# Patient Record
Sex: FEMALE | Race: OTHER | HISPANIC OR LATINO
[De-identification: names, ages, dates, MRNs, and addresses within clinical notes are randomized per-mention and may not be internally consistent; named-entity substitution may affect disease eponyms.]

---

## 2021-02-25 ENCOUNTER — APPOINTMENT (OUTPATIENT)
Dept: UROLOGY | Facility: CLINIC | Age: 21
End: 2021-02-25
Payer: MEDICAID

## 2021-02-25 VITALS
BODY MASS INDEX: 34.41 KG/M2 | HEART RATE: 87 BPM | HEIGHT: 62 IN | OXYGEN SATURATION: 96 % | DIASTOLIC BLOOD PRESSURE: 68 MMHG | SYSTOLIC BLOOD PRESSURE: 114 MMHG | WEIGHT: 187 LBS | TEMPERATURE: 98.3 F | RESPIRATION RATE: 14 BRPM

## 2021-02-25 DIAGNOSIS — N39.44 NOCTURNAL ENURESIS: ICD-10-CM

## 2021-02-25 DIAGNOSIS — N39.498 OTHER SPECIFIED URINARY INCONTINENCE: ICD-10-CM

## 2021-02-25 PROBLEM — Z00.00 ENCOUNTER FOR PREVENTIVE HEALTH EXAMINATION: Status: ACTIVE | Noted: 2021-02-25

## 2021-02-25 PROCEDURE — 99072 ADDL SUPL MATRL&STAF TM PHE: CPT

## 2021-02-25 PROCEDURE — 99203 OFFICE O/P NEW LOW 30 MIN: CPT

## 2021-02-25 RX ORDER — LEVONORGESTREL AND ETHINYL ESTRADIOL 0.1-0.02MG
KIT ORAL
Refills: 0 | Status: ACTIVE | COMMUNITY

## 2021-03-01 NOTE — REVIEW OF SYSTEMS
[Sore Throat] : sore throat [Diarrhea] : diarrhea [Heartburn] : heartburn [Painful [place]] : painful [place] [Genital bacterial infection] : genital bacterial infection [Sexually Transmitted Disease (STD)] : sexually transmitted disease [Date of last menstrual period ____] : date of last menstrual period: [unfilled] [Abnormal menstrual period, if yes explain ___] : abnormal menstrual period [unfilled] [Urine Infection (bladder/kidney)] : bladder/kidney infection [Wake up at night to urinate  How many times?  ___] : wakes up to urinate [unfilled] times during the night [Strong urge to urinate] : strong urge to urinate [Bladder pressure] : experiences bladder pressure [Strain or push to urinate] : strain or push to urinate [Itching] : itching [Anxiety] : anxiety [Feelings Of Weakness] : feelings of weakness [Negative] : Heme/Lymph

## 2021-03-01 NOTE — PHYSICAL EXAM
[General Appearance - Well Developed] : well developed [General Appearance - Well Nourished] : well nourished [General Appearance - In No Acute Distress] : no acute distress [Edema] : no peripheral edema [Exaggerated Use Of Accessory Muscles For Inspiration] : no accessory muscle use [Abdomen Soft] : soft [Abdomen Tenderness] : non-tender [Costovertebral Angle Tenderness] : no ~M costovertebral angle tenderness [Urinary Bladder Findings] : the bladder was normal on palpation [Normal Station and Gait] : the gait and station were normal for the patient's age [Skin Color & Pigmentation] : normal skin color and pigmentation [No Focal Deficits] : no focal deficits [Oriented To Time, Place, And Person] : oriented to person, place, and time [Cervical Lymph Nodes Enlarged Anterior Bilaterally] : anterior cervical [Supraclavicular Lymph Nodes Enlarged Bilaterally] : supraclavicular

## 2021-03-01 NOTE — ASSESSMENT
[FreeTextEntry1] : Nocturnal enuresis and OAB with incontinence. Sx are longstanding. No evidence of voiding dysfunction by hx. Has failed first line therapies. Discussed possible PTNS. For now, [facility] and consider referral for UDS and tx. \par --Refer to Dr Nails for UDS

## 2021-03-01 NOTE — HISTORY OF PRESENT ILLNESS
[FreeTextEntry1] : 19yo female with cc of urinary frequency and incontinence. Pt reports having issues with bedwetting until she was 16. She continues to have some issues on occasion with occasional daytime urge incontinence. Will also have nocturnal enuresis on occasion (once per week). This is sometimes related to alcohol (about once per month). At baseline, has nocturia 2-3 times. She has less daytime bother going 4-6 times in a day. She notes her underwear is often damp and she is not always aware of when she is leaking. She drinks very little. No issues with constipation. Denies straining to void. No abd/pelvic pain. She recalls always leaking. she has tried myrbetriq, kegels, and desmopressin all without benefit. Mom had similar issues until her teens. No hx of UDS testing.

## 2021-03-01 NOTE — REVIEW OF SYSTEMS
[Sore Throat] : sore throat [Diarrhea] : diarrhea [Heartburn] : heartburn [Painful [place]] : painful [place] [Genital bacterial infection] : genital bacterial infection [Sexually Transmitted Disease (STD)] : sexually transmitted disease [Date of last menstrual period ____] : date of last menstrual period: [unfilled] [Abnormal menstrual period, if yes explain ___] : abnormal menstrual period [unfilled] [Urine Infection (bladder/kidney)] : bladder/kidney infection [Wake up at night to urinate  How many times?  ___] : wakes up to urinate [unfilled] times during the night [Strong urge to urinate] : strong urge to urinate [Bladder pressure] : experiences bladder pressure [Strain or push to urinate] : strain or push to urinate [Itching] : itching [Anxiety] : anxiety [Feelings Of Weakness] : feelings of weakness [Negative] : Heme/Lymph

## 2021-03-01 NOTE — HISTORY OF PRESENT ILLNESS
[FreeTextEntry1] : 21yo female with cc of urinary frequency and incontinence. Pt reports having issues with bedwetting until she was 16. She continues to have some issues on occasion with occasional daytime urge incontinence. Will also have nocturnal enuresis on occasion (once per week). This is sometimes related to alcohol (about once per month). At baseline, has nocturia 2-3 times. She has less daytime bother going 4-6 times in a day. She notes her underwear is often damp and she is not always aware of when she is leaking. She drinks very little. No issues with constipation. Denies straining to void. No abd/pelvic pain. She recalls always leaking. she has tried myrbetriq, kegels, and desmopressin all without benefit. Mom had similar issues until her teens. No hx of UDS testing.

## 2021-03-01 NOTE — ASSESSMENT
[FreeTextEntry1] : Nocturnal enuresis and OAB with incontinence. Sx are longstanding. No evidence of voiding dysfunction by hx. Has failed first line therapies. Discussed possible PTNS. For now, [facility] and consider referral for UDS and tx. \par --Refer to Dr Nails for UDS

## 2021-03-02 LAB
APPEARANCE: ABNORMAL
BACTERIA UR CULT: NORMAL
BACTERIA: ABNORMAL
BILIRUBIN URINE: NEGATIVE
BLOOD URINE: NEGATIVE
CALCIUM OXALATE CRYSTALS: ABNORMAL
COLOR: YELLOW
GLUCOSE QUALITATIVE U: NEGATIVE
HYALINE CASTS: 0 /LPF
KETONES URINE: NEGATIVE
LEUKOCYTE ESTERASE URINE: ABNORMAL
MICROSCOPIC-UA: NORMAL
NITRITE URINE: NEGATIVE
PH URINE: 7
PROTEIN URINE: ABNORMAL
RED BLOOD CELLS URINE: 4 /HPF
SPECIFIC GRAVITY URINE: 1.03
SQUAMOUS EPITHELIAL CELLS: 14 /HPF
URINE COMMENTS: NORMAL
UROBILINOGEN URINE: NORMAL
WHITE BLOOD CELLS URINE: 16 /HPF

## 2021-05-06 ENCOUNTER — APPOINTMENT (OUTPATIENT)
Dept: UROLOGY | Facility: CLINIC | Age: 21
End: 2021-05-06

## 2022-03-14 ENCOUNTER — TRANSCRIPTION ENCOUNTER (OUTPATIENT)
Age: 22
End: 2022-03-14

## 2022-03-14 ENCOUNTER — EMERGENCY (EMERGENCY)
Facility: HOSPITAL | Age: 22
LOS: 1 days | Discharge: HOME | End: 2022-03-14
Attending: EMERGENCY MEDICINE | Admitting: EMERGENCY MEDICINE
Payer: MEDICAID

## 2022-03-14 VITALS
TEMPERATURE: 98 F | HEART RATE: 59 BPM | DIASTOLIC BLOOD PRESSURE: 65 MMHG | RESPIRATION RATE: 18 BRPM | OXYGEN SATURATION: 100 % | SYSTOLIC BLOOD PRESSURE: 101 MMHG

## 2022-03-14 VITALS
WEIGHT: 169.98 LBS | HEART RATE: 70 BPM | TEMPERATURE: 98 F | RESPIRATION RATE: 18 BRPM | SYSTOLIC BLOOD PRESSURE: 111 MMHG | OXYGEN SATURATION: 100 % | DIASTOLIC BLOOD PRESSURE: 75 MMHG

## 2022-03-14 LAB
ALBUMIN SERPL ELPH-MCNC: 3.9 G/DL — SIGNIFICANT CHANGE UP (ref 3.3–5)
ALP SERPL-CCNC: 97 U/L — SIGNIFICANT CHANGE UP (ref 40–120)
ALT FLD-CCNC: 22 U/L — SIGNIFICANT CHANGE UP (ref 12–78)
ANION GAP SERPL CALC-SCNC: 3 MMOL/L — LOW (ref 5–17)
AST SERPL-CCNC: 13 U/L — LOW (ref 15–37)
BASOPHILS # BLD AUTO: 0.05 K/UL — SIGNIFICANT CHANGE UP (ref 0–0.2)
BASOPHILS NFR BLD AUTO: 0.7 % — SIGNIFICANT CHANGE UP (ref 0–2)
BILIRUB SERPL-MCNC: 0.3 MG/DL — SIGNIFICANT CHANGE UP (ref 0.2–1.2)
BUN SERPL-MCNC: 13 MG/DL — SIGNIFICANT CHANGE UP (ref 7–23)
CALCIUM SERPL-MCNC: 8.9 MG/DL — SIGNIFICANT CHANGE UP (ref 8.5–10.1)
CHLORIDE SERPL-SCNC: 108 MMOL/L — SIGNIFICANT CHANGE UP (ref 96–108)
CO2 SERPL-SCNC: 29 MMOL/L — SIGNIFICANT CHANGE UP (ref 22–31)
CREAT SERPL-MCNC: 0.78 MG/DL — SIGNIFICANT CHANGE UP (ref 0.5–1.3)
EGFR: 110 ML/MIN/1.73M2 — SIGNIFICANT CHANGE UP
EOSINOPHIL # BLD AUTO: 0.07 K/UL — SIGNIFICANT CHANGE UP (ref 0–0.5)
EOSINOPHIL NFR BLD AUTO: 1 % — SIGNIFICANT CHANGE UP (ref 0–6)
GLUCOSE SERPL-MCNC: 99 MG/DL — SIGNIFICANT CHANGE UP (ref 70–99)
HCG SERPL-ACNC: <1 MIU/ML — SIGNIFICANT CHANGE UP
HCT VFR BLD CALC: 39.4 % — SIGNIFICANT CHANGE UP (ref 34.5–45)
HGB BLD-MCNC: 13.4 G/DL — SIGNIFICANT CHANGE UP (ref 11.5–15.5)
IMM GRANULOCYTES NFR BLD AUTO: 0.3 % — SIGNIFICANT CHANGE UP (ref 0–1.5)
LYMPHOCYTES # BLD AUTO: 2.76 K/UL — SIGNIFICANT CHANGE UP (ref 1–3.3)
LYMPHOCYTES # BLD AUTO: 40.2 % — SIGNIFICANT CHANGE UP (ref 13–44)
MCHC RBC-ENTMCNC: 29.5 PG — SIGNIFICANT CHANGE UP (ref 27–34)
MCHC RBC-ENTMCNC: 34 GM/DL — SIGNIFICANT CHANGE UP (ref 32–36)
MCV RBC AUTO: 86.6 FL — SIGNIFICANT CHANGE UP (ref 80–100)
MONOCYTES # BLD AUTO: 0.58 K/UL — SIGNIFICANT CHANGE UP (ref 0–0.9)
MONOCYTES NFR BLD AUTO: 8.4 % — SIGNIFICANT CHANGE UP (ref 2–14)
NEUTROPHILS # BLD AUTO: 3.39 K/UL — SIGNIFICANT CHANGE UP (ref 1.8–7.4)
NEUTROPHILS NFR BLD AUTO: 49.4 % — SIGNIFICANT CHANGE UP (ref 43–77)
NRBC # BLD: 0 /100 WBCS — SIGNIFICANT CHANGE UP (ref 0–0)
PLATELET # BLD AUTO: 225 K/UL — SIGNIFICANT CHANGE UP (ref 150–400)
POTASSIUM SERPL-MCNC: 4.1 MMOL/L — SIGNIFICANT CHANGE UP (ref 3.5–5.3)
POTASSIUM SERPL-SCNC: 4.1 MMOL/L — SIGNIFICANT CHANGE UP (ref 3.5–5.3)
PROT SERPL-MCNC: 7.2 G/DL — SIGNIFICANT CHANGE UP (ref 6–8.3)
RBC # BLD: 4.55 M/UL — SIGNIFICANT CHANGE UP (ref 3.8–5.2)
RBC # FLD: 12.5 % — SIGNIFICANT CHANGE UP (ref 10.3–14.5)
SODIUM SERPL-SCNC: 140 MMOL/L — SIGNIFICANT CHANGE UP (ref 135–145)
WBC # BLD: 6.87 K/UL — SIGNIFICANT CHANGE UP (ref 3.8–10.5)
WBC # FLD AUTO: 6.87 K/UL — SIGNIFICANT CHANGE UP (ref 3.8–10.5)

## 2022-03-14 PROCEDURE — 99284 EMERGENCY DEPT VISIT MOD MDM: CPT | Mod: 25

## 2022-03-14 PROCEDURE — 85025 COMPLETE CBC W/AUTO DIFF WBC: CPT

## 2022-03-14 PROCEDURE — 84702 CHORIONIC GONADOTROPIN TEST: CPT

## 2022-03-14 PROCEDURE — 70450 CT HEAD/BRAIN W/O DYE: CPT | Mod: 26,MA

## 2022-03-14 PROCEDURE — 70450 CT HEAD/BRAIN W/O DYE: CPT | Mod: MA

## 2022-03-14 PROCEDURE — 80053 COMPREHEN METABOLIC PANEL: CPT

## 2022-03-14 PROCEDURE — 96374 THER/PROPH/DIAG INJ IV PUSH: CPT

## 2022-03-14 PROCEDURE — 36415 COLL VENOUS BLD VENIPUNCTURE: CPT

## 2022-03-14 PROCEDURE — 99285 EMERGENCY DEPT VISIT HI MDM: CPT

## 2022-03-14 RX ORDER — ONDANSETRON 8 MG/1
1 TABLET, FILM COATED ORAL
Qty: 12 | Refills: 0
Start: 2022-03-14 | End: 2022-03-16

## 2022-03-14 RX ORDER — MECLIZINE HCL 12.5 MG
25 TABLET ORAL ONCE
Refills: 0 | Status: COMPLETED | OUTPATIENT
Start: 2022-03-14 | End: 2022-03-14

## 2022-03-14 RX ORDER — SODIUM CHLORIDE 9 MG/ML
1000 INJECTION INTRAMUSCULAR; INTRAVENOUS; SUBCUTANEOUS ONCE
Refills: 0 | Status: COMPLETED | OUTPATIENT
Start: 2022-03-14 | End: 2022-03-14

## 2022-03-14 RX ORDER — METOCLOPRAMIDE HCL 10 MG
10 TABLET ORAL ONCE
Refills: 0 | Status: COMPLETED | OUTPATIENT
Start: 2022-03-14 | End: 2022-03-14

## 2022-03-14 RX ORDER — MECLIZINE HCL 12.5 MG
1 TABLET ORAL
Qty: 20 | Refills: 0
Start: 2022-03-14 | End: 2022-03-18

## 2022-03-14 RX ADMIN — Medication 25 MILLIGRAM(S): at 18:20

## 2022-03-14 RX ADMIN — Medication 10 MILLIGRAM(S): at 18:20

## 2022-03-14 RX ADMIN — SODIUM CHLORIDE 1000 MILLILITER(S): 9 INJECTION INTRAMUSCULAR; INTRAVENOUS; SUBCUTANEOUS at 18:19

## 2022-03-14 NOTE — ED PROVIDER NOTE - DISCHARGE DATE
Yoshi Oquendo has called requesting a refill of their controlled medication, percocet , for the management of her chronic back and knee pain. Last office visit date: 06/25/18    Date last  was pulled and reviewed : 08/28/18, last fill date for percocet was 07/27/18. Last fill date for opana ER was 06/25/18. Pt states that she has not had a dose of this since late July. Pt has already weaned herself off of this. Was the patient compliant when the above report was pulled? yes    Analgesia: pt reports a 50% pain relief with her current opioid medication regimen    Aberrancies: none noted     ADL's: pt reports being able to perform normal daily duties while taking her medication. Adverse Reaction: pt reports mild constipation. Provider's last note and plan of care reviewed? Yes, pre-printed prescriptions. Request forwarded to provider for review. 14-Mar-2022

## 2022-03-14 NOTE — ED PROVIDER NOTE - PATIENT PORTAL LINK FT
You can access the FollowMyHealth Patient Portal offered by [facility] by registering at the following website: [email or web address]. By joining [employer]’s FollowMyHealth portal, you will also be able to view your health information using other applications (apps) compatible with our system.

## 2022-03-14 NOTE — ED PROVIDER NOTE - OBJECTIVE STATEMENT
otherwise healthy 21 year old female presents with intermittent dizziness and nausea. had intermittent dizziness (feels like head is spinning, worse with movement) with nausea for 2 months. then resolved for about a month, but symptoms returned today. no injury or trauma. slight frontal HA. no vomiting. no fevers. feeling anxious "worried about her symptoms". no HI or SI. no chest pain or shortness of breath.   PCP Emy Groves NP

## 2022-03-14 NOTE — ED ADULT NURSE NOTE - OBJECTIVE STATEMENT
20 y/o female received in the ER, AA&Ox4, breathing unlabored and regular, c/o dizziness and nausea since this morning. Pt denies any abdominal pain, chest pain, emesis, fever/chills. Pt was evaluated by provider, pending orders, will continue to monitor.

## 2022-03-14 NOTE — ED PROVIDER NOTE - NSFOLLOWUPINSTRUCTIONS_ED_ALL_ED_FT
drink plenty of fluids  antivert every 6-8 hours for dizziness  zofran every 6-8 hours for nausea  follow up with neurologist, referral provided       Dizziness    WHAT YOU NEED TO KNOW:    Dizziness is a feeling of being off balance or unsteady. Common causes of dizziness are an inner ear fluid imbalance or a lack of oxygen in your blood. Dizziness may be acute (lasts 3 days or less) or chronic (lasts longer than 3 days). You may have dizzy spells that last from seconds to a few hours.     DISCHARGE INSTRUCTIONS:    Return to the emergency department if:   •You have a headache and a stiff neck.      •You have shaking chills and a fever.       •You vomit over and over with no relief.       •Your vomit or bowel movements are red or black.       •You have pain in your chest, back, or abdomen.       •You have numbness, especially in your face, arms, or legs.       •You have trouble moving your arms or legs.       •You are confused.       Contact your healthcare provider if:   •You have a fever.       •Your symptoms do not get better with treatment.       •You have questions or concerns about your condition or care.       Manage your symptoms:   •Do not drive or operate heavy machinery when you are dizzy.       •Get up slowly from sitting or lying down.       •Drink plenty of liquids. Liquids help prevent dehydration. Ask how much liquid to drink each day and which liquids are best for you.      Follow up with your doctor as directed: Write down your questions so you remember to ask them during your visits.

## 2022-03-14 NOTE — ED PROVIDER NOTE - NEURO NEGATIVE STATEMENT, MLM
no loss of consciousness, no gait abnormality, slight frontal headache, +dizziness (feels like head is spinning)

## 2022-03-14 NOTE — ED PROVIDER NOTE - PROGRESS NOTE DETAILS
Reevaluated patient at bedside.  Patient feeling much improved.  dizziness, HA, and nausea resolved. ambulatory with stead gait. referral to neuro provided. Discussed the results of all diagnostic testing in ED and copies of all reports given.   An opportunity to ask questions was given.  Discussed the importance of prompt, close medical follow-up.  Patient will return with any changes, concerns or persistent / worsening symptoms.  Understanding of all instructions verbalized.

## 2022-03-14 NOTE — ED PROVIDER NOTE - NEUROLOGICAL, MLM
Alert and oriented, no focal deficits, no motor or sensory deficits. symmetric eyebrow raise and smile. elevates tongue and shoulders without difficulty. normal finger to nose. good  strength bilaterally ambulatory with steady gait

## 2022-03-14 NOTE — ED PROVIDER NOTE - CARE PROVIDER_API CALL
Arelis Morales  NEUROLOGY  [address] [place] [address]  Phone: [phone]  Fax: [phone]  Follow Up Time: 1-3 Days    Manjula Loza  NEUROLOGY  [address], [address]  [place] [address]  Phone: [phone]  Fax: [phone]  Follow Up Time: 1-3 Days    Pallavi Rivers)  Neurology  [address] [place] [address]  Phone: [phone]  Fax: [phone]  Follow Up Time: 1-3 Days

## 2022-03-14 NOTE — ED PROVIDER NOTE - ATTENDING CONTRIBUTION TO CARE
20 y/o F with vertigo symptoms x 2 months worsened today.  dizziness worse with head movement associated with nausea and HE    PE: well appearing no distress  symptoms exacerbated with head rotation  heart/lungs wnl  no nystagmus    labs, CT, meds

## 2022-03-14 NOTE — ED PROVIDER NOTE - ENMT, MLM
Airway patent,  TM's clear. no sinus tenderness to palpation. Throat has no vesicles, no oropharyngeal exudates and uvula is midline.

## 2022-03-14 NOTE — ED PROVIDER NOTE - CLINICAL SUMMARY MEDICAL DECISION MAKING FREE TEXT BOX
intermittent dizziness past 3 months. slight HA. no chest pain or shortness of breath. no neuro deficits on exam or problems with ambulation. suspect BPV. other differentials include but not limited to mass, ICH, CVA, dehydration, electrolyte abnormality. will check labs, CT head, medicate with Antivert and Reglan. neuro follow up

## 2022-03-14 NOTE — ED PROVIDER NOTE - PROVIDER TOKENS
PROVIDER:[TOKEN:[[id number]],FOLLOWUP:[1-3 Days]],PROVIDER:[TOKEN:[[id number]],FOLLOWUP:[1-3 Days]],PROVIDER:[TOKEN:[[id number]],FOLLOWUP:[1-3 Days]]

## 2023-01-28 ENCOUNTER — NON-APPOINTMENT (OUTPATIENT)
Age: 23
End: 2023-01-28

## 2023-02-04 ENCOUNTER — NON-APPOINTMENT (OUTPATIENT)
Age: 23
End: 2023-02-04

## 2023-08-23 ENCOUNTER — NON-APPOINTMENT (OUTPATIENT)
Age: 23
End: 2023-08-23

## 2023-08-29 ENCOUNTER — NON-APPOINTMENT (OUTPATIENT)
Age: 23
End: 2023-08-29

## 2023-10-26 ENCOUNTER — INPATIENT (INPATIENT)
Facility: HOSPITAL | Age: 23
LOS: 2 days | Discharge: HOME | DRG: 812 | End: 2023-10-29
Attending: STUDENT IN AN ORGANIZED HEALTH CARE EDUCATION/TRAINING PROGRAM | Admitting: STUDENT IN AN ORGANIZED HEALTH CARE EDUCATION/TRAINING PROGRAM
Payer: MEDICAID

## 2023-10-26 VITALS
SYSTOLIC BLOOD PRESSURE: 123 MMHG | DIASTOLIC BLOOD PRESSURE: 77 MMHG | RESPIRATION RATE: 16 BRPM | TEMPERATURE: 98 F | OXYGEN SATURATION: 100 % | HEART RATE: 96 BPM | WEIGHT: 194.01 LBS | HEIGHT: 62 IN

## 2023-10-26 PROCEDURE — 99285 EMERGENCY DEPT VISIT HI MDM: CPT

## 2023-10-26 NOTE — ED ADULT NURSE NOTE - NSFALLUNIVINTERV_ED_ALL_ED
Bed/Stretcher in lowest position, wheels locked, appropriate side rails in place/Call bell, personal items and telephone in reach/Instruct patient to call for assistance before getting out of bed/chair/stretcher/Non-slip footwear applied when patient is off stretcher/[place] to call system/Physically safe environment - no spills, clutter or unnecessary equipment/Purposeful proactive rounding/Room/bathroom lighting operational, light cord in reach

## 2023-10-26 NOTE — ED ADULT NURSE NOTE - CHIEF COMPLAINT QUOTE
vaginal bleeding x 3.5 weeks with large clots, headache as of today with weakness and nausea/vomiting

## 2023-10-27 DIAGNOSIS — N93.9 ABNORMAL UTERINE AND VAGINAL BLEEDING, UNSPECIFIED: ICD-10-CM

## 2023-10-27 DIAGNOSIS — N93.8 OTHER SPECIFIED ABNORMAL UTERINE AND VAGINAL BLEEDING: ICD-10-CM

## 2023-10-27 DIAGNOSIS — D50.0 IRON DEFICIENCY ANEMIA SECONDARY TO BLOOD LOSS (CHRONIC): ICD-10-CM

## 2023-10-27 DIAGNOSIS — Z29.9 ENCOUNTER FOR PROPHYLACTIC MEASURES, UNSPECIFIED: ICD-10-CM

## 2023-10-27 LAB
ALBUMIN SERPL ELPH-MCNC: 3.4 G/DL — SIGNIFICANT CHANGE UP (ref 3.3–5)
ALBUMIN SERPL ELPH-MCNC: 3.4 G/DL — SIGNIFICANT CHANGE UP (ref 3.3–5)
ALP SERPL-CCNC: 84 U/L — SIGNIFICANT CHANGE UP (ref 40–120)
ALP SERPL-CCNC: 84 U/L — SIGNIFICANT CHANGE UP (ref 40–120)
ALT FLD-CCNC: 31 U/L — SIGNIFICANT CHANGE UP (ref 12–78)
ALT FLD-CCNC: 31 U/L — SIGNIFICANT CHANGE UP (ref 12–78)
ANION GAP SERPL CALC-SCNC: 6 MMOL/L — SIGNIFICANT CHANGE UP (ref 5–17)
ANION GAP SERPL CALC-SCNC: 6 MMOL/L — SIGNIFICANT CHANGE UP (ref 5–17)
APTT BLD: 31.1 SEC — SIGNIFICANT CHANGE UP (ref 24.5–35.6)
APTT BLD: 31.1 SEC — SIGNIFICANT CHANGE UP (ref 24.5–35.6)
AST SERPL-CCNC: 38 U/L — HIGH (ref 15–37)
AST SERPL-CCNC: 38 U/L — HIGH (ref 15–37)
BASOPHILS # BLD AUTO: 0.02 K/UL — SIGNIFICANT CHANGE UP (ref 0–0.2)
BASOPHILS # BLD AUTO: 0.02 K/UL — SIGNIFICANT CHANGE UP (ref 0–0.2)
BASOPHILS # BLD AUTO: 0.03 K/UL — SIGNIFICANT CHANGE UP (ref 0–0.2)
BASOPHILS # BLD AUTO: 0.03 K/UL — SIGNIFICANT CHANGE UP (ref 0–0.2)
BASOPHILS NFR BLD AUTO: 0.3 % — SIGNIFICANT CHANGE UP (ref 0–2)
BASOPHILS NFR BLD AUTO: 0.3 % — SIGNIFICANT CHANGE UP (ref 0–2)
BASOPHILS NFR BLD AUTO: 0.4 % — SIGNIFICANT CHANGE UP (ref 0–2)
BASOPHILS NFR BLD AUTO: 0.4 % — SIGNIFICANT CHANGE UP (ref 0–2)
BILIRUB SERPL-MCNC: 0.3 MG/DL — SIGNIFICANT CHANGE UP (ref 0.2–1.2)
BILIRUB SERPL-MCNC: 0.3 MG/DL — SIGNIFICANT CHANGE UP (ref 0.2–1.2)
BUN SERPL-MCNC: 11 MG/DL — SIGNIFICANT CHANGE UP (ref 7–23)
BUN SERPL-MCNC: 11 MG/DL — SIGNIFICANT CHANGE UP (ref 7–23)
CALCIUM SERPL-MCNC: 8.3 MG/DL — LOW (ref 8.5–10.1)
CALCIUM SERPL-MCNC: 8.3 MG/DL — LOW (ref 8.5–10.1)
CHLORIDE SERPL-SCNC: 107 MMOL/L — SIGNIFICANT CHANGE UP (ref 96–108)
CHLORIDE SERPL-SCNC: 107 MMOL/L — SIGNIFICANT CHANGE UP (ref 96–108)
CO2 SERPL-SCNC: 26 MMOL/L — SIGNIFICANT CHANGE UP (ref 22–31)
CO2 SERPL-SCNC: 26 MMOL/L — SIGNIFICANT CHANGE UP (ref 22–31)
CREAT SERPL-MCNC: 0.56 MG/DL — SIGNIFICANT CHANGE UP (ref 0.5–1.3)
CREAT SERPL-MCNC: 0.56 MG/DL — SIGNIFICANT CHANGE UP (ref 0.5–1.3)
EGFR: 131 ML/MIN/1.73M2 — SIGNIFICANT CHANGE UP
EGFR: 131 ML/MIN/1.73M2 — SIGNIFICANT CHANGE UP
EOSINOPHIL # BLD AUTO: 0.02 K/UL — SIGNIFICANT CHANGE UP (ref 0–0.5)
EOSINOPHIL # BLD AUTO: 0.02 K/UL — SIGNIFICANT CHANGE UP (ref 0–0.5)
EOSINOPHIL # BLD AUTO: 0.07 K/UL — SIGNIFICANT CHANGE UP (ref 0–0.5)
EOSINOPHIL # BLD AUTO: 0.07 K/UL — SIGNIFICANT CHANGE UP (ref 0–0.5)
EOSINOPHIL NFR BLD AUTO: 0.3 % — SIGNIFICANT CHANGE UP (ref 0–6)
EOSINOPHIL NFR BLD AUTO: 0.3 % — SIGNIFICANT CHANGE UP (ref 0–6)
EOSINOPHIL NFR BLD AUTO: 1.2 % — SIGNIFICANT CHANGE UP (ref 0–6)
EOSINOPHIL NFR BLD AUTO: 1.2 % — SIGNIFICANT CHANGE UP (ref 0–6)
GLUCOSE SERPL-MCNC: 131 MG/DL — HIGH (ref 70–99)
GLUCOSE SERPL-MCNC: 131 MG/DL — HIGH (ref 70–99)
HCG SERPL-ACNC: <1 MIU/ML — SIGNIFICANT CHANGE UP
HCG SERPL-ACNC: <1 MIU/ML — SIGNIFICANT CHANGE UP
HCT VFR BLD CALC: 20.1 % — CRITICAL LOW (ref 34.5–45)
HCT VFR BLD CALC: 20.1 % — CRITICAL LOW (ref 34.5–45)
HCT VFR BLD CALC: 22.4 % — LOW (ref 34.5–45)
HCT VFR BLD CALC: 22.4 % — LOW (ref 34.5–45)
HGB BLD-MCNC: 6.4 G/DL — CRITICAL LOW (ref 11.5–15.5)
HGB BLD-MCNC: 6.4 G/DL — CRITICAL LOW (ref 11.5–15.5)
HGB BLD-MCNC: 7.4 G/DL — LOW (ref 11.5–15.5)
HGB BLD-MCNC: 7.4 G/DL — LOW (ref 11.5–15.5)
IMM GRANULOCYTES NFR BLD AUTO: 0.1 % — SIGNIFICANT CHANGE UP (ref 0–0.9)
IMM GRANULOCYTES NFR BLD AUTO: 0.1 % — SIGNIFICANT CHANGE UP (ref 0–0.9)
IMM GRANULOCYTES NFR BLD AUTO: 0.3 % — SIGNIFICANT CHANGE UP (ref 0–0.9)
IMM GRANULOCYTES NFR BLD AUTO: 0.3 % — SIGNIFICANT CHANGE UP (ref 0–0.9)
INR BLD: 1.1 RATIO — SIGNIFICANT CHANGE UP (ref 0.85–1.18)
INR BLD: 1.1 RATIO — SIGNIFICANT CHANGE UP (ref 0.85–1.18)
LYMPHOCYTES # BLD AUTO: 1.31 K/UL — SIGNIFICANT CHANGE UP (ref 1–3.3)
LYMPHOCYTES # BLD AUTO: 1.31 K/UL — SIGNIFICANT CHANGE UP (ref 1–3.3)
LYMPHOCYTES # BLD AUTO: 1.96 K/UL — SIGNIFICANT CHANGE UP (ref 1–3.3)
LYMPHOCYTES # BLD AUTO: 1.96 K/UL — SIGNIFICANT CHANGE UP (ref 1–3.3)
LYMPHOCYTES # BLD AUTO: 17.3 % — SIGNIFICANT CHANGE UP (ref 13–44)
LYMPHOCYTES # BLD AUTO: 17.3 % — SIGNIFICANT CHANGE UP (ref 13–44)
LYMPHOCYTES # BLD AUTO: 33.7 % — SIGNIFICANT CHANGE UP (ref 13–44)
LYMPHOCYTES # BLD AUTO: 33.7 % — SIGNIFICANT CHANGE UP (ref 13–44)
MCHC RBC-ENTMCNC: 27.8 PG — SIGNIFICANT CHANGE UP (ref 27–34)
MCHC RBC-ENTMCNC: 27.8 PG — SIGNIFICANT CHANGE UP (ref 27–34)
MCHC RBC-ENTMCNC: 28.2 PG — SIGNIFICANT CHANGE UP (ref 27–34)
MCHC RBC-ENTMCNC: 28.2 PG — SIGNIFICANT CHANGE UP (ref 27–34)
MCHC RBC-ENTMCNC: 31.8 GM/DL — LOW (ref 32–36)
MCHC RBC-ENTMCNC: 31.8 GM/DL — LOW (ref 32–36)
MCHC RBC-ENTMCNC: 33 GM/DL — SIGNIFICANT CHANGE UP (ref 32–36)
MCHC RBC-ENTMCNC: 33 GM/DL — SIGNIFICANT CHANGE UP (ref 32–36)
MCV RBC AUTO: 85.5 FL — SIGNIFICANT CHANGE UP (ref 80–100)
MCV RBC AUTO: 85.5 FL — SIGNIFICANT CHANGE UP (ref 80–100)
MCV RBC AUTO: 87.4 FL — SIGNIFICANT CHANGE UP (ref 80–100)
MCV RBC AUTO: 87.4 FL — SIGNIFICANT CHANGE UP (ref 80–100)
MONOCYTES # BLD AUTO: 0.37 K/UL — SIGNIFICANT CHANGE UP (ref 0–0.9)
MONOCYTES # BLD AUTO: 0.37 K/UL — SIGNIFICANT CHANGE UP (ref 0–0.9)
MONOCYTES # BLD AUTO: 0.49 K/UL — SIGNIFICANT CHANGE UP (ref 0–0.9)
MONOCYTES # BLD AUTO: 0.49 K/UL — SIGNIFICANT CHANGE UP (ref 0–0.9)
MONOCYTES NFR BLD AUTO: 4.9 % — SIGNIFICANT CHANGE UP (ref 2–14)
MONOCYTES NFR BLD AUTO: 4.9 % — SIGNIFICANT CHANGE UP (ref 2–14)
MONOCYTES NFR BLD AUTO: 8.4 % — SIGNIFICANT CHANGE UP (ref 2–14)
MONOCYTES NFR BLD AUTO: 8.4 % — SIGNIFICANT CHANGE UP (ref 2–14)
NEUTROPHILS # BLD AUTO: 3.26 K/UL — SIGNIFICANT CHANGE UP (ref 1.8–7.4)
NEUTROPHILS # BLD AUTO: 3.26 K/UL — SIGNIFICANT CHANGE UP (ref 1.8–7.4)
NEUTROPHILS # BLD AUTO: 5.84 K/UL — SIGNIFICANT CHANGE UP (ref 1.8–7.4)
NEUTROPHILS # BLD AUTO: 5.84 K/UL — SIGNIFICANT CHANGE UP (ref 1.8–7.4)
NEUTROPHILS NFR BLD AUTO: 56.1 % — SIGNIFICANT CHANGE UP (ref 43–77)
NEUTROPHILS NFR BLD AUTO: 56.1 % — SIGNIFICANT CHANGE UP (ref 43–77)
NEUTROPHILS NFR BLD AUTO: 77 % — SIGNIFICANT CHANGE UP (ref 43–77)
NEUTROPHILS NFR BLD AUTO: 77 % — SIGNIFICANT CHANGE UP (ref 43–77)
NRBC # BLD: 0 /100 WBCS — SIGNIFICANT CHANGE UP (ref 0–0)
PLATELET # BLD AUTO: 260 K/UL — SIGNIFICANT CHANGE UP (ref 150–400)
PLATELET # BLD AUTO: 260 K/UL — SIGNIFICANT CHANGE UP (ref 150–400)
PLATELET # BLD AUTO: 288 K/UL — SIGNIFICANT CHANGE UP (ref 150–400)
PLATELET # BLD AUTO: 288 K/UL — SIGNIFICANT CHANGE UP (ref 150–400)
POTASSIUM SERPL-MCNC: 3.9 MMOL/L — SIGNIFICANT CHANGE UP (ref 3.5–5.3)
POTASSIUM SERPL-MCNC: 3.9 MMOL/L — SIGNIFICANT CHANGE UP (ref 3.5–5.3)
POTASSIUM SERPL-SCNC: 3.9 MMOL/L — SIGNIFICANT CHANGE UP (ref 3.5–5.3)
POTASSIUM SERPL-SCNC: 3.9 MMOL/L — SIGNIFICANT CHANGE UP (ref 3.5–5.3)
PROT SERPL-MCNC: 6.7 G/DL — SIGNIFICANT CHANGE UP (ref 6–8.3)
PROT SERPL-MCNC: 6.7 G/DL — SIGNIFICANT CHANGE UP (ref 6–8.3)
PROTHROM AB SERPL-ACNC: 12.8 SEC — SIGNIFICANT CHANGE UP (ref 9.5–13)
PROTHROM AB SERPL-ACNC: 12.8 SEC — SIGNIFICANT CHANGE UP (ref 9.5–13)
RBC # BLD: 2.3 M/UL — LOW (ref 3.8–5.2)
RBC # BLD: 2.3 M/UL — LOW (ref 3.8–5.2)
RBC # BLD: 2.62 M/UL — LOW (ref 3.8–5.2)
RBC # BLD: 2.62 M/UL — LOW (ref 3.8–5.2)
RBC # FLD: 12.7 % — SIGNIFICANT CHANGE UP (ref 10.3–14.5)
RBC # FLD: 12.7 % — SIGNIFICANT CHANGE UP (ref 10.3–14.5)
RBC # FLD: 12.8 % — SIGNIFICANT CHANGE UP (ref 10.3–14.5)
RBC # FLD: 12.8 % — SIGNIFICANT CHANGE UP (ref 10.3–14.5)
SODIUM SERPL-SCNC: 139 MMOL/L — SIGNIFICANT CHANGE UP (ref 135–145)
SODIUM SERPL-SCNC: 139 MMOL/L — SIGNIFICANT CHANGE UP (ref 135–145)
WBC # BLD: 5.82 K/UL — SIGNIFICANT CHANGE UP (ref 3.8–10.5)
WBC # BLD: 5.82 K/UL — SIGNIFICANT CHANGE UP (ref 3.8–10.5)
WBC # BLD: 7.58 K/UL — SIGNIFICANT CHANGE UP (ref 3.8–10.5)
WBC # BLD: 7.58 K/UL — SIGNIFICANT CHANGE UP (ref 3.8–10.5)
WBC # FLD AUTO: 5.82 K/UL — SIGNIFICANT CHANGE UP (ref 3.8–10.5)
WBC # FLD AUTO: 5.82 K/UL — SIGNIFICANT CHANGE UP (ref 3.8–10.5)
WBC # FLD AUTO: 7.58 K/UL — SIGNIFICANT CHANGE UP (ref 3.8–10.5)
WBC # FLD AUTO: 7.58 K/UL — SIGNIFICANT CHANGE UP (ref 3.8–10.5)

## 2023-10-27 PROCEDURE — 99222 1ST HOSP IP/OBS MODERATE 55: CPT | Mod: GC

## 2023-10-27 PROCEDURE — 76830 TRANSVAGINAL US NON-OB: CPT | Mod: 26

## 2023-10-27 RX ORDER — SODIUM CHLORIDE 9 MG/ML
1000 INJECTION INTRAMUSCULAR; INTRAVENOUS; SUBCUTANEOUS ONCE
Refills: 0 | Status: COMPLETED | OUTPATIENT
Start: 2023-10-27 | End: 2023-10-27

## 2023-10-27 RX ORDER — NORETHINDRONE AND ETHINYL ESTRADIOL 0.4-0.035
1 KIT ORAL
Qty: 84 | Refills: 1
Start: 2023-10-27

## 2023-10-27 RX ORDER — IRON SUCROSE 20 MG/ML
200 INJECTION, SOLUTION INTRAVENOUS ONCE
Refills: 0 | Status: COMPLETED | OUTPATIENT
Start: 2023-10-27 | End: 2023-10-27

## 2023-10-27 RX ORDER — NORETHINDRONE 0.35 MG/1
2 TABLET ORAL
Qty: 40 | Refills: 0
Start: 2023-10-27 | End: 2023-11-05

## 2023-10-27 RX ORDER — ONDANSETRON 8 MG/1
1 TABLET, FILM COATED ORAL
Qty: 12 | Refills: 0
Start: 2023-10-27 | End: 2023-10-29

## 2023-10-27 RX ORDER — TRANEXAMIC ACID 100 MG/ML
1000 INJECTION, SOLUTION INTRAVENOUS ONCE
Refills: 0 | Status: COMPLETED | OUTPATIENT
Start: 2023-10-27 | End: 2023-10-27

## 2023-10-27 RX ORDER — NORETHINDRONE 0.35 MG/1
5 TABLET ORAL
Refills: 0 | Status: DISCONTINUED | OUTPATIENT
Start: 2023-10-27 | End: 2023-10-29

## 2023-10-27 RX ORDER — LANOLIN ALCOHOL/MO/W.PET/CERES
3 CREAM (GRAM) TOPICAL AT BEDTIME
Refills: 0 | Status: DISCONTINUED | OUTPATIENT
Start: 2023-10-27 | End: 2023-10-29

## 2023-10-27 RX ORDER — KETOROLAC TROMETHAMINE 30 MG/ML
15 SYRINGE (ML) INJECTION ONCE
Refills: 0 | Status: DISCONTINUED | OUTPATIENT
Start: 2023-10-27 | End: 2023-10-27

## 2023-10-27 RX ORDER — NORETHINDRONE 0.35 MG/1
1 TABLET ORAL
Qty: 20 | Refills: 0
Start: 2023-10-27 | End: 2023-11-05

## 2023-10-27 RX ORDER — ACETAMINOPHEN 500 MG
1000 TABLET ORAL ONCE
Refills: 0 | Status: COMPLETED | OUTPATIENT
Start: 2023-10-27 | End: 2023-10-27

## 2023-10-27 RX ORDER — ERYTHROPOIETIN 10000 [IU]/ML
10000 INJECTION, SOLUTION INTRAVENOUS; SUBCUTANEOUS ONCE
Refills: 0 | Status: COMPLETED | OUTPATIENT
Start: 2023-10-27 | End: 2023-10-27

## 2023-10-27 RX ORDER — ONDANSETRON 8 MG/1
4 TABLET, FILM COATED ORAL ONCE
Refills: 0 | Status: COMPLETED | OUTPATIENT
Start: 2023-10-27 | End: 2023-10-27

## 2023-10-27 RX ORDER — ONDANSETRON 8 MG/1
4 TABLET, FILM COATED ORAL EVERY 8 HOURS
Refills: 0 | Status: DISCONTINUED | OUTPATIENT
Start: 2023-10-27 | End: 2023-10-29

## 2023-10-27 RX ORDER — ISOTRETINOIN 20 MG/1
2 CAPSULE, LIQUID FILLED ORAL
Refills: 0 | DISCHARGE

## 2023-10-27 RX ORDER — FERROUS SULFATE 325(65) MG
1 TABLET ORAL
Qty: 90 | Refills: 0
Start: 2023-10-27 | End: 2023-11-25

## 2023-10-27 RX ORDER — ACETAMINOPHEN 500 MG
650 TABLET ORAL EVERY 6 HOURS
Refills: 0 | Status: DISCONTINUED | OUTPATIENT
Start: 2023-10-27 | End: 2023-10-29

## 2023-10-27 RX ORDER — PREGABALIN 225 MG/1
1000 CAPSULE ORAL DAILY
Refills: 0 | Status: DISCONTINUED | OUTPATIENT
Start: 2023-10-27 | End: 2023-10-29

## 2023-10-27 RX ORDER — FOLIC ACID 0.8 MG
1 TABLET ORAL DAILY
Refills: 0 | Status: DISCONTINUED | OUTPATIENT
Start: 2023-10-27 | End: 2023-10-29

## 2023-10-27 RX ADMIN — ERYTHROPOIETIN 10000 UNIT(S): 10000 INJECTION, SOLUTION INTRAVENOUS; SUBCUTANEOUS at 21:54

## 2023-10-27 RX ADMIN — Medication 1000 MILLIGRAM(S): at 01:10

## 2023-10-27 RX ADMIN — Medication 15 MILLIGRAM(S): at 11:20

## 2023-10-27 RX ADMIN — SODIUM CHLORIDE 1000 MILLILITER(S): 9 INJECTION INTRAMUSCULAR; INTRAVENOUS; SUBCUTANEOUS at 03:36

## 2023-10-27 RX ADMIN — NORETHINDRONE 5 MILLIGRAM(S): 0.35 TABLET ORAL at 17:23

## 2023-10-27 RX ADMIN — Medication 15 MILLIGRAM(S): at 10:46

## 2023-10-27 RX ADMIN — PREGABALIN 1000 MICROGRAM(S): 225 CAPSULE ORAL at 21:53

## 2023-10-27 RX ADMIN — SODIUM CHLORIDE 1000 MILLILITER(S): 9 INJECTION INTRAMUSCULAR; INTRAVENOUS; SUBCUTANEOUS at 00:41

## 2023-10-27 RX ADMIN — TRANEXAMIC ACID 220 MILLIGRAM(S): 100 INJECTION, SOLUTION INTRAVENOUS at 06:00

## 2023-10-27 RX ADMIN — ONDANSETRON 4 MILLIGRAM(S): 8 TABLET, FILM COATED ORAL at 00:41

## 2023-10-27 RX ADMIN — SODIUM CHLORIDE 1000 MILLILITER(S): 9 INJECTION INTRAMUSCULAR; INTRAVENOUS; SUBCUTANEOUS at 07:33

## 2023-10-27 RX ADMIN — Medication 1 TABLET(S): at 21:53

## 2023-10-27 RX ADMIN — Medication 400 MILLIGRAM(S): at 00:40

## 2023-10-27 RX ADMIN — IRON SUCROSE 200 MILLIGRAM(S): 20 INJECTION, SOLUTION INTRAVENOUS at 21:56

## 2023-10-27 RX ADMIN — Medication 1 MILLIGRAM(S): at 21:53

## 2023-10-27 RX ADMIN — TRANEXAMIC ACID 220 MILLIGRAM(S): 100 INJECTION, SOLUTION INTRAVENOUS at 15:54

## 2023-10-27 NOTE — ED PROVIDER NOTE - PROGRESS NOTE DETAILS
Patient made aware of low H&H patient is a Jehovah witness declining blood transfusion.  Pending ultrasound results. s/o to follow up GYN consult- seen by gyn who wants her to see Heme for possible Iron infusion. Heme onc made aware, will see patient.    GYN requesting additional dose of TXA and is working with pharmacy to get dose of aygestin here. Heme re-contacted, they will see patient. per RN, patient feels well. paged heme/onc, awaiting call back

## 2023-10-27 NOTE — H&P ADULT - NSHPSOCIALHISTORY_GEN_ALL_CORE
Tobacco:   EtOH:    Recreational drug use:   Lives with:   Ambulates:   ADLs: Tobacco: none  EtOH:  social   Recreational drug use: none  Lives with: dad/sister  Ambulates: independent   ADLs: independent

## 2023-10-27 NOTE — CONSULT NOTE ADULT - CONSULT REASON
D62 Anemia Hemorrhage D62      Anemia Hemorrhage  D50.0   Fe def anemia  N92.1    Excessive and frequent menstruation with irregular cycle  Z53.1    Procedure and treatment not carried out because of patient's decision for reasons of belief and Jehovah Witness

## 2023-10-27 NOTE — ED PROVIDER NOTE - NSFOLLOWUPINSTRUCTIONS_ED_ALL_ED_FT
Please follow up with your Primary Care Physician and any specialists- GYN and Heme-Onc  Please take your medications as prescribed and or instructed.  If your symptoms persist or worsen, please seek care. Either return to the Emergency Department, go to urgent care or see your primary care doctor.  Please refer to general information and instructions attached or below:     Anemia    WHAT YOU NEED TO KNOW:    Anemia is a low number of red blood cells or a low amount of hemoglobin in your red blood cells. Hemoglobin is a protein that helps carry oxygen throughout your body. Red blood cells use iron to create hemoglobin. Anemia may develop if your body does not have enough iron. It may also develop if your body does not make enough red blood cells or they die faster than your body can make them.     DISCHARGE INSTRUCTIONS:    Call 911 or have someone call 911 for any of the following:     You lose consciousness.      You have severe chest pain.    Return to the emergency department if:     You have dark or bloody bowel movements.        Contact your healthcare provider if:     Your symptoms are worse, even after treatment.      You have questions or concerns about your condition or care.    Medicines:     Iron or folic acid supplements help increase your red blood cell and hemoglobin levels.       Vitamin B12 injections may help boost your red blood cell level and decrease your symptoms. Ask your healthcare provider how to inject B12.      Take your medicine as directed. Contact your healthcare provider if you think your medicine is not helping or if you have side effects. Tell him of her if you are allergic to any medicine. Keep a list of the medicines, vitamins, and herbs you take. Include the amounts, and when and why you take them. Bring the list or the pill bottles to follow-up visits. Carry your medicine list with you in case of an emergency.    Prevent anemia: Eat healthy foods rich in iron and vitamin C. Nuts, meat, dark leafy green vegetables, and beans are high in iron and protein. Vitamin C helps your body absorb iron. Foods rich in vitamin C include oranges and other citrus fruits. Ask your healthcare provider for a list of other foods that are high in iron or vitamin C. Ask if you need to be on a special diet.     Follow up with your healthcare provider as directed: Write down your questions so you remember to ask them during your visits.

## 2023-10-27 NOTE — H&P ADULT - ASSESSMENT
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 Admitted for vaginal bleeding/anemia.

## 2023-10-27 NOTE — ED PROVIDER NOTE - CLINICAL SUMMARY MEDICAL DECISION MAKING FREE TEXT BOX
23-year-old female last menstrual period in September complaining of vaginal bleeding since October 2 states she has been changing 4 heavy pads a day.  Now having generalized weakness.  Denies any dizziness shortness of breath.  Seen her GYN today who states that she wanted to schedule her for an ultrasound to rule out any masses prior to starting her on any birth control.    heavy vaginal bleeding r/o mass, r/o pregnancy, labs, US

## 2023-10-27 NOTE — ED ADULT NURSE REASSESSMENT NOTE - NSFALLUNIVINTERV_ED_ALL_ED
Bed/Stretcher in lowest position, wheels locked, appropriate side rails in place/Call bell, personal items and telephone in reach/Instruct patient to call for assistance before getting out of bed/chair/stretcher/Non-slip footwear applied when patient is off stretcher/[place] to call system/Physically safe environment - no spills, clutter or unnecessary equipment/Purposeful proactive rounding/Room/bathroom lighting operational, light cord in reach

## 2023-10-27 NOTE — CONSULT NOTE ADULT - SUBJECTIVE AND OBJECTIVE BOX
CORAZON Grove is a 23y Female Last Menstrual Period Oct 2, 2023;   Aug/2023 presented to the ER with fatigue.       HPI Objective Statement: 23-year-old female last menstrual period in September complaining of vaginal bleeding since October 2 states she has been changing 4 heavy pads a day.  Now having generalized weakness.  Denies any dizziness shortness of breath.  Seen her GYN today who states that she wanted to schedule her for an ultrasound to rule out any masses prior to starting her on any birth control.   Ms Longo had been on Oral contraceptives in the past but stopped them earlier this year. She had lost weight and her periods were regular. She admits gaining weight and period becoming irregular. She has a history of irregular menses in the past. She states she is scheduled for Bariatric surgery in Dec/ 2023.        Home Medications:    Allergies    No Known Allergies    Intolerances      PAST MEDICAL & SURGICAL HISTORY:  Uterus didelphys        FAMILY HISTORY:    REVIEW OF SYSTEMS        Vital Signs Last 24 Hrs  T(C): 36.8 (27 Oct 2023 07:33), Max: 36.9 (26 Oct 2023 22:17)  T(F): 98.3 (27 Oct 2023 07:33), Max: 98.5 (26 Oct 2023 22:17)  HR: 67 (27 Oct 2023 09:06) (67 - 96)  BP: 92/67 (27 Oct 2023 09:06) (72/43 - 123/77)  BP(mean): --  RR: 18 (27 Oct 2023 07:33) (15 - 18)  SpO2: 98% (27 Oct 2023 07:33) (98% - 100%)    Parameters below as of 27 Oct 2023 07:33  Patient On (Oxygen Delivery Method): room air         LABS:                        7.4    7.58  )-----------( 288      ( 27 Oct 2023 00:21 )             22.4                 10-27    139  |  107  |  11  ----------------------------<  131<H>  3.9   |  26  |  0.56    Ca    8.3<L>      27 Oct 2023 00:21    TPro  6.7  /  Alb  3.4  /  TBili  0.3  /  DBili  x   /  AST  38<H>  /  ALT  31  /  AlkPhos  84  10-27                PT/INR - ( 27 Oct 2023 00:21 )   PT: 12.8 sec;   INR: 1.10 ratio         PTT - ( 27 Oct 2023 00:21 )  PTT:31.1 sec              Urinalysis Basic - ( 27 Oct 2023 00:21 )    Color: x / Appearance: x / SG: x / pH: x  Gluc: 131 mg/dL / Ketone: x  / Bili: x / Urobili: x   Blood: x / Protein: x / Nitrite: x   Leuk Esterase: x / RBC: x / WBC x   Sq Epi: x / Non Sq Epi: x / Bacteria: x      < from: US Transvaginal (10.27.23 @ 03:12) >  FINDINGS:    Uterus: 8.0 cm x 3.4 cm x 4.9 cm. Within normal limits.  Endometrium: 18 mm. Heterogeneous echotexture.  Cervix: Nabothian cyst.  Right ovary: 3.2 cm x 2.6 cm x 3.2 cm. Small follicles. Flow present.  Left ovary: 3.7 cm x 2.5 cm x 2.7 cm. Small follicles. Flow present.  Fluid: Trace.    < end of copied text >                     PHYSICAL EXAM:      Constitutional: Young  N/C A/T in NAD oriented x 3    HEENT: wnl    Respiratory: clear    Cardiovascular: RR    Gastrointestinal: soft, n/t BS+    Genitourinary: normal female genitalia with light vaginal bleeding    Extremities: WNL    Neurological: grossly intact    Skin: smooth, warm and dry    Musculoskeletal: wnl    Psychiatric: appropriate

## 2023-10-27 NOTE — ED ADULT NURSE REASSESSMENT NOTE - NS ED NURSE REASSESS COMMENT FT1
1047:  pt states she changed her pad at 4 am and then again one time today at 8am, pt states the pad as NOt saturdated at that time.  Pt reports mild headache denies cramping.   Denies dizziness, respirations even and unlabored. BN
Received pt from outgoing RN at 0715am this morning.  Pt alert. calm respirations even and unlabored.  Pt denies cp/sob/palpitations.  skin warm and dry though pt does appears pale.   Pt continues to state she will refuse blood transfusions due to her Yazidism beliefs.  Pt educated on safety risks of anemia, verbalizes understanding.   NSR on cardiac monitor.   Pt calm   States last pad change was at 4am and was saturated though states the bleeding has slowed over the last few hours.   Pt denies acute suprapubic/abd pain.   Safety maintained.  OB/GYN in to see pt earlier this moniring.  Pt treated for hypotension per stat orders with IVF.   Will continue to safely monitor. BN
Assumed care of Pt from previous RN, Pt sts vaginal bleeding has lessened, feeling better at this time, awaiting hematology oncology consult, will continue to monitor closely.

## 2023-10-27 NOTE — H&P ADULT - PROBLEM SELECTOR PLAN 1
Pt presents to the ED for vaginal bleeding x1 month  - Vaginal US shows Heterogeneous echotexture of the endometrium, which may be due to blood products/debris. Underlying hyperplasia or lesion is not excluded. Recommend clinical correlation and follow-up.  - s/p NS bolus x2, ofirmev x1, EPO x1, Venofer 200mg x1, toradol 15mg x1, zofran 4mg x1, TXA 1g x2  - hb 7.4 -> 6.4 on admission  - tachycardia, hypotension on exam  - continue venofer x3 days   - f/u iron studies   - Per [facility], Aygestin 5 mg one tablet BID x 10 days, Nortel 1/35 28 day pack to take q day.  - Limit blood draws to necessary blood  - monitor cbc   - tylenol prn for headache   - remote tele   - ambulate with assistance   - Dr. Thee Fatima, consulted; additional lab studies ordered Pt presents to the ED for vaginal bleeding x1 month  - Pt is a jehovahs witness and does not accept blood products; risk of arrhythmia and death explained to patient. Pt expresses understanding. Will optimize pt medically without transfusion.  - Vaginal US shows Heterogeneous echotexture of the endometrium, which may be due to blood products/debris. Underlying hyperplasia or lesion is not excluded. Recommend clinical correlation and follow-up.  - s/p NS bolus x2, ofirmev x1, EPO x1, Venofer 200mg x1, toradol 15mg x1, zofran 4mg x1, TXA 1g x2  - hb 7.4 -> 6.4 on admission  - tachycardia, hypotension on exam  - continue venofer x3 days   - f/u iron studies   - Per [facility], Aygestin 5 mg one tablet BID x 10 days, Nortel 1/35 28 day pack to take q day.  - Limit blood draws to necessary blood  - monitor cbc   - tylenol prn for headache   - remote tele   - ambulate with assistance   - Dr. Thee Fatima, consulted; additional lab studies ordered Pt presents to the ED for vaginal bleeding x1 month  - Pt is a jehovahs witness and does not accept blood products; risk of arrhythmia and death explained to patient. Pt expresses understanding. Will optimize pt medically without transfusion.  - Vaginal US shows Heterogeneous echotexture of the endometrium, which may be due to blood products/debris. Underlying hyperplasia or lesion is not excluded. Recommend clinical correlation and follow-up.  - s/p NS bolus x2, ofirmev x1, EPO x1, Venofer 200mg x1, toradol 15mg x1, zofran 4mg x1, TXA 1g x2  - hb 7.4 -> 6.4 on admission  - tachycardia, hypotension on exam, continue IVF hydration  - continue venofer x3 days   - f/u iron studies   - Per [facility], Aygestin 5 mg one tablet BID x 10 days, Nortrel 1/35 28 day pack to take q day.  - Limit blood draws to necessary blood  - monitor cbc   - tylenol prn for headache   - remote tele   - ambulate with assistance   - Dr. Thee Fatima, consulted; additional lab studies ordered, limit blood draws to once a day

## 2023-10-27 NOTE — H&P ADULT - NSHPPHYSICALEXAM_GEN_ALL_CORE
T(C): 37 (10-27-23 @ 20:10), Max: 37.1 (10-27-23 @ 17:50)  HR: 86 (10-27-23 @ 20:10) (67 - 96)  BP: 109/70 (10-27-23 @ 20:10) (72/43 - 123/77)  RR: 18 (10-27-23 @ 20:10) (15 - 18)  SpO2: 100% (10-27-23 @ 20:10) (98% - 100%)    GENERAL: patient appears well, no acute distress, appropriately interactive  EYES: sclera clear, no exudates  ENMT: oropharynx clear without erythema, no exudates, moist mucous membranes  NECK: supple, soft  LUNGS: good air entry bilaterally, clear to auscultation, symmetric breath sounds, no wheezing or rhonchi appreciated  HEART: soft S1/S2, regular rate and rhythm, no murmurs noted, no lower extremity edema  GASTROINTESTINAL: abdomen is soft, nontender, nondistended, normoactive bowel sounds  INTEGUMENT: good skin turgor, warm skin, appears well perfused  MUSCULOSKELETAL: no clubbing or cyanosis, no obvious deformity  NEUROLOGIC: awake, alert, oriented x3, good muscle tone in all 4 extremities  HEME/LYMPH: no obvious ecchymosis or petechiae T(C): 37 (10-27-23 @ 20:10), Max: 37.1 (10-27-23 @ 17:50)  HR: 86 (10-27-23 @ 20:10) (67 - 96)  BP: 109/70 (10-27-23 @ 20:10) (72/43 - 123/77)  RR: 18 (10-27-23 @ 20:10) (15 - 18)  SpO2: 100% (10-27-23 @ 20:10) (98% - 100%)    GENERAL: patient appears well, no acute distress, appropriately interactive  EYES: sclera clear, no exudates  ENMT: oropharynx clear without erythema, no exudates, moist mucous membranes  NECK: supple, soft  LUNGS: good air entry bilaterally, clear to auscultation, symmetric breath sounds, no wheezing or rhonchi appreciated  HEART: soft S1/S2, +tachycardic, no murmurs noted, no lower extremity edema  GASTROINTESTINAL: abdomen is soft, nontender, nondistended, normoactive bowel sounds  INTEGUMENT: good cap refill; good skin turgor, warm skin, appears well perfused  MUSCULOSKELETAL: no clubbing or cyanosis, no obvious deformity  NEUROLOGIC: awake, alert, oriented x3, good muscle tone in all 4 extremities  HEME/LYMPH: no obvious ecchymosis or petechiae T(C): 37 (10-27-23 @ 20:10), Max: 37.1 (10-27-23 @ 17:50)  HR: 86 (10-27-23 @ 20:10) (67 - 96)  BP: 109/70 (10-27-23 @ 20:10) (72/43 - 123/77)  RR: 18 (10-27-23 @ 20:10) (15 - 18)  SpO2: 100% (10-27-23 @ 20:10) (98% - 100%)    GENERAL: pale, patient appears well, no acute distress, appropriately interactive  EYES: sclera clear, no exudates  ENMT: oropharynx clear without erythema, no exudates, moist mucous membranes  NECK: supple, soft  LUNGS: good air entry bilaterally, clear to auscultation, symmetric breath sounds, no wheezing or rhonchi appreciated  HEART: soft S1/S2, +tachycardic, no murmurs noted, no lower extremity edema  GASTROINTESTINAL: abdomen is soft, nontender, nondistended, normoactive bowel sounds  INTEGUMENT: good cap refill; good skin turgor, warm skin, appears well perfused  MUSCULOSKELETAL: no clubbing or cyanosis, no obvious deformity  NEUROLOGIC: awake, alert, oriented x3, good muscle tone in all 4 extremities  HEME/LYMPH: no obvious ecchymosis or petechiae

## 2023-10-27 NOTE — ED PROVIDER NOTE - CARE PROVIDER_API CALL
Hon Thee Burrows  Medical Oncology  [address], [address]  [place] [address]  Phone: [phone]  Fax: [phone]  Established Patient  Follow Up Time: 4-6 Days

## 2023-10-27 NOTE — H&P ADULT - HISTORY OF PRESENT ILLNESS
23-year-old female last menstrual period in September complaining of vaginal bleeding since October 2 states she has been changing 4 heavy pads a day.     ED course  Vitals: T 98.5, HR 86, /77 -> 72/43 -> 109/70, RR 18, SpO2 100% on RA  Significant labs: Hb 7.4 -> 6.4,   Imaging:   Vaginal US shows Heterogeneous echotexture of the endometrium, which may be due to blood products/debris. Underlying hyperplasia or lesion is not excluded. Recommend clinical correlation and follow-up.  EKG:   In ED patient given: NS bolus x2, ofirmev x1, EPO x1, Venofer 200mg x1, toradol 15mg x1, zofran 4mg x1, TXA 1g x2   23-year-old female with PMHx of uterine didelyphys presents to the ED for vaginal bleeding with associated lethargy and dizziness x1 month. Pt states that she started her period on October 2nd; however, bleeding never stopped. Initially pt was using 2-3 pads a day; however, 2 weeks ago bleeding escalated. Pt states that she was wearing both a tampon and pad and she was soaking through both. Pt states that she has had 1 episode of excessive vaginal bleeding in 2012. Pt was admitted to [facility] tx with blood transfusion and iron. Pt was then started on birth control. Birth control stopped 2 yrs ago. States initially periods were regular; however, became irregular soon after; however, prior to this episode, periods have been generally mild. States that she was able to wear a tampon with a liner. Pt elicits no family hx of bleeding disorders. No hx of STDs, ovarian cysts, or fibroids. No abnormal pap smears in the past. Pt states that she saw PA at her [facility] office couple days ago. Pelvic ultrasound ordered and performed; however, she has not yet received the results. Pt was prescribed PO iron and Alyacen 1-35 28 PO. Has not taken any of the medication.     ED course  Vitals: T 98.5, HR 86, /77 -> 72/43 -> 109/70, RR 18, SpO2 100% on RA  Significant labs: Hb 7.4 -> 6.4,   Imaging:   Vaginal US shows Heterogeneous echotexture of the endometrium, which may be due to blood products/debris. Underlying hyperplasia or lesion is not excluded. Recommend clinical correlation and follow-up.  In ED patient given: NS bolus x2, ofirmev x1, EPO x1, Venofer 200mg x1, toradol 15mg x1, zofran 4mg x1, TXA 1g x2

## 2023-10-27 NOTE — ED PROVIDER NOTE - CARE PLAN
Principal Discharge DX:	Vaginal bleeding   1 Principal Discharge DX:	Vaginal bleeding  Secondary Diagnosis:	Acute anemia

## 2023-10-27 NOTE — H&P ADULT - NSHPREVIEWOFSYSTEMS_GEN_ALL_CORE
CONSTITUTIONAL: denies fever, chills, fatigue, weakness  HEENT: denies blurred vision, sore throat  SKIN: denies new lesions, rash  CARDIOVASCULAR: denies chest pain, chest pressure, palpitations  RESPIRATORY: denies shortness of breath, cough, sputum production  GASTROINTESTINAL: denies nausea, vomiting, diarrhea, abdominal pain, melena or hematochezia  GENITOURINARY: denies dysuria, discharge  NEUROLOGICAL: denies numbness, headache, focal weakness  MUSCULOSKELETAL: denies new joint pain, muscle aches  HEMATOLOGIC: denies gross bleeding, bruising CONSTITUTIONAL: denies fever, chills, fatigue, weakness  HEENT: denies blurred vision, sore throat  SKIN: denies new lesions, rash  CARDIOVASCULAR: denies chest pain, chest pressure, palpitations  RESPIRATORY: denies shortness of breath, cough, sputum production  GASTROINTESTINAL: denies nausea, vomiting, diarrhea, abdominal pain, melena or hematochezia  GENITOURINARY: denies dysuria, discharge  NEUROLOGICAL: + headache, denies numbness, focal weakness  MUSCULOSKELETAL: denies new joint pain, muscle aches  HEMATOLOGIC: denies gross bleeding, bruising

## 2023-10-27 NOTE — CONSULT NOTE ADULT - ASSESSMENT
[ASSESSMENT and  PLAN]  D62      Anemia Hemorrhage  D50.0   Fe def anemia  N92.1    Excessive and frequent menstruation with irregular cycle  Z53.1    Procedure and treatment not carried out because of patient's decision for reasons of belief and Jehovah Witness    22yo with Jehovah Witness with anemia due to vaginal bleeding.   Pt with symptomatic anemia  Declined/ refused transfusions   States aware may die from no transfusion, for which pt is OK with. Hx of transfusion prior at 11yo.   Pt awake, alert, AOx4, has insight into health issues, and risk.     Offered YVONNE, IV iron, IM B12 and PO Folic acid to possible help with blood production.   Pt was made aware that such measures may take days or more likely weeks to increase blood production.   During this time, pt may succumb to cardiovascular complications from anemia, including but limited to, CVA, MI, death, CHF, or permanent disability.    Pt asked appropriate questions, Conferred with Orthodox member via telephone, and agreeable to measures above.     Recommended to pt to be admitted to hospital and to monitor Hgb    RECOMMENDATIONS    Admit for monitoring  Recommend telemetry    No transfusion, per pt.   Follow CBC  Limit blood draws to necessary blood once per day, eg CBC.     Eval for other treatable condition  Gyn eval appreciated.     DVT Prophylaxis  SQ Lovenox or SQ heparin contraindicated  Place on SCD    No strenuous activity.   OOB only with assistance.     Prognosis guarded.     Discussed plan of care with patient in detail. Pt younger sister and father at bedside.   Pt  expressed understanding of the treatment plan.   Risks, benefits and alternatives discussed in detail.   Opportunity given for questions and discussion.   Questions or concerns all addressed and answered to their satisfaction, and in lay terms.     Discussed with Dr Cole.    Thank you for consulting us.     > 71 minutes spent in direct patient care, examining and counseling patient,  reviewing  the notes, lab data/ imaging , discussion with multidisciplinary team.     
Young female with DUB with associated anemia. She c/o fatigue but no SOB or palpitations. She is a Jehovah witness and has refused blood transfusion. She was given TXA 1 gram IVPB with some improvement of her vaginal bleeding.  She will be given a rx for Aygestin 5 mg to be taken BID for 10 days. At the end of this treatment she will have a withdrawal bleed. She will then start Nortel 1/35 daily, one tablet daily. She should continue with oral contractive to control her menses. She should f/u with her OB/GYN.  Ms Monge should take iron and stool softner.

## 2023-10-27 NOTE — H&P ADULT - ATTENDING COMMENTS
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 Admitted for vaginal bleeding/anemia.    refusing blood transfusion due to Mosque beliefs. pt is aware of risk involved and still deciding to refuse. at risk for abrupt decompensation if bleeding continues. ivf hydration. monitor closely. gyn consult appreciated. heme/onc consult appreciated.

## 2023-10-27 NOTE — ED PROVIDER NOTE - OBJECTIVE STATEMENT
23-year-old female last menstrual period in September complaining of vaginal bleeding since October 2 states she has been changing 4 heavy pads a day.  Now having generalized weakness.  Denies any dizziness shortness of breath.  Seen her GYN today who states that she wanted to schedule her for an ultrasound to rule out any masses prior to starting her on any birth control.

## 2023-10-27 NOTE — ED PROVIDER NOTE - NOTES
recommend giving TXA 1 gram IV, aygestin 10mg BID x 10days, will hold till AM for patient to be seen

## 2023-10-27 NOTE — CONSULT NOTE ADULT - PROBLEM SELECTOR RECOMMENDATION 2
refused blood transfusion  iron   stool softener  see Hematologist for evaluation and IV iron if she can not tolerate oral iron

## 2023-10-27 NOTE — ED ADULT NURSE REASSESSMENT NOTE - STATUS
What Is The Reason For Today's Visit?: Follow Up Non-Melanoma Skin Cancer
How Many Skin Cancers Have You Had?: more than one
Additional History: Pt presents with history of bcc and SCC. Patient complains of a lesion on her left forehead.
awaiting consult

## 2023-10-28 DIAGNOSIS — D50.0 IRON DEFICIENCY ANEMIA SECONDARY TO BLOOD LOSS (CHRONIC): ICD-10-CM

## 2023-10-28 LAB
ALBUMIN SERPL ELPH-MCNC: 2.8 G/DL — LOW (ref 3.3–5)
ALBUMIN SERPL ELPH-MCNC: 2.8 G/DL — LOW (ref 3.3–5)
ALP SERPL-CCNC: 72 U/L — SIGNIFICANT CHANGE UP (ref 40–120)
ALP SERPL-CCNC: 72 U/L — SIGNIFICANT CHANGE UP (ref 40–120)
ALT FLD-CCNC: 27 U/L — SIGNIFICANT CHANGE UP (ref 12–78)
ALT FLD-CCNC: 27 U/L — SIGNIFICANT CHANGE UP (ref 12–78)
ANION GAP SERPL CALC-SCNC: 3 MMOL/L — LOW (ref 5–17)
ANION GAP SERPL CALC-SCNC: 3 MMOL/L — LOW (ref 5–17)
AST SERPL-CCNC: 26 U/L — SIGNIFICANT CHANGE UP (ref 15–37)
AST SERPL-CCNC: 26 U/L — SIGNIFICANT CHANGE UP (ref 15–37)
BASOPHILS # BLD AUTO: 0.02 K/UL — SIGNIFICANT CHANGE UP (ref 0–0.2)
BASOPHILS # BLD AUTO: 0.02 K/UL — SIGNIFICANT CHANGE UP (ref 0–0.2)
BASOPHILS NFR BLD AUTO: 0.3 % — SIGNIFICANT CHANGE UP (ref 0–2)
BASOPHILS NFR BLD AUTO: 0.3 % — SIGNIFICANT CHANGE UP (ref 0–2)
BILIRUB SERPL-MCNC: 0.1 MG/DL — LOW (ref 0.2–1.2)
BILIRUB SERPL-MCNC: 0.1 MG/DL — LOW (ref 0.2–1.2)
BUN SERPL-MCNC: 10 MG/DL — SIGNIFICANT CHANGE UP (ref 7–23)
BUN SERPL-MCNC: 10 MG/DL — SIGNIFICANT CHANGE UP (ref 7–23)
CALCIUM SERPL-MCNC: 7.8 MG/DL — LOW (ref 8.5–10.1)
CALCIUM SERPL-MCNC: 7.8 MG/DL — LOW (ref 8.5–10.1)
CHLORIDE SERPL-SCNC: 114 MMOL/L — HIGH (ref 96–108)
CHLORIDE SERPL-SCNC: 114 MMOL/L — HIGH (ref 96–108)
CO2 SERPL-SCNC: 27 MMOL/L — SIGNIFICANT CHANGE UP (ref 22–31)
CO2 SERPL-SCNC: 27 MMOL/L — SIGNIFICANT CHANGE UP (ref 22–31)
CREAT SERPL-MCNC: 0.61 MG/DL — SIGNIFICANT CHANGE UP (ref 0.5–1.3)
CREAT SERPL-MCNC: 0.61 MG/DL — SIGNIFICANT CHANGE UP (ref 0.5–1.3)
CRP SERPL-MCNC: <3 MG/L — SIGNIFICANT CHANGE UP
CRP SERPL-MCNC: <3 MG/L — SIGNIFICANT CHANGE UP
EGFR: 128 ML/MIN/1.73M2 — SIGNIFICANT CHANGE UP
EGFR: 128 ML/MIN/1.73M2 — SIGNIFICANT CHANGE UP
EOSINOPHIL # BLD AUTO: 0.06 K/UL — SIGNIFICANT CHANGE UP (ref 0–0.5)
EOSINOPHIL # BLD AUTO: 0.06 K/UL — SIGNIFICANT CHANGE UP (ref 0–0.5)
EOSINOPHIL NFR BLD AUTO: 0.9 % — SIGNIFICANT CHANGE UP (ref 0–6)
EOSINOPHIL NFR BLD AUTO: 0.9 % — SIGNIFICANT CHANGE UP (ref 0–6)
ERYTHROCYTE [SEDIMENTATION RATE] IN BLOOD: 28 MM/HR — HIGH (ref 0–15)
ERYTHROCYTE [SEDIMENTATION RATE] IN BLOOD: 28 MM/HR — HIGH (ref 0–15)
FERRITIN SERPL-MCNC: 91 NG/ML — SIGNIFICANT CHANGE UP (ref 15–150)
FERRITIN SERPL-MCNC: 91 NG/ML — SIGNIFICANT CHANGE UP (ref 15–150)
FOLATE SERPL-MCNC: >20 NG/ML — SIGNIFICANT CHANGE UP
FOLATE SERPL-MCNC: >20 NG/ML — SIGNIFICANT CHANGE UP
GLUCOSE SERPL-MCNC: 124 MG/DL — HIGH (ref 70–99)
GLUCOSE SERPL-MCNC: 124 MG/DL — HIGH (ref 70–99)
HCT VFR BLD CALC: 19.7 % — CRITICAL LOW (ref 34.5–45)
HCT VFR BLD CALC: 19.7 % — CRITICAL LOW (ref 34.5–45)
HGB BLD-MCNC: 6.3 G/DL — CRITICAL LOW (ref 11.5–15.5)
HGB BLD-MCNC: 6.3 G/DL — CRITICAL LOW (ref 11.5–15.5)
IMM GRANULOCYTES NFR BLD AUTO: 0.5 % — SIGNIFICANT CHANGE UP (ref 0–0.9)
IMM GRANULOCYTES NFR BLD AUTO: 0.5 % — SIGNIFICANT CHANGE UP (ref 0–0.9)
IRON SATN MFR SERPL: 356 UG/DL — HIGH (ref 30–160)
IRON SATN MFR SERPL: 356 UG/DL — HIGH (ref 30–160)
IRON SATN MFR SERPL: 94 % — HIGH (ref 14–50)
IRON SATN MFR SERPL: 94 % — HIGH (ref 14–50)
LYMPHOCYTES # BLD AUTO: 1.91 K/UL — SIGNIFICANT CHANGE UP (ref 1–3.3)
LYMPHOCYTES # BLD AUTO: 1.91 K/UL — SIGNIFICANT CHANGE UP (ref 1–3.3)
LYMPHOCYTES # BLD AUTO: 28.7 % — SIGNIFICANT CHANGE UP (ref 13–44)
LYMPHOCYTES # BLD AUTO: 28.7 % — SIGNIFICANT CHANGE UP (ref 13–44)
MCHC RBC-ENTMCNC: 27.8 PG — SIGNIFICANT CHANGE UP (ref 27–34)
MCHC RBC-ENTMCNC: 27.8 PG — SIGNIFICANT CHANGE UP (ref 27–34)
MCHC RBC-ENTMCNC: 32 GM/DL — SIGNIFICANT CHANGE UP (ref 32–36)
MCHC RBC-ENTMCNC: 32 GM/DL — SIGNIFICANT CHANGE UP (ref 32–36)
MCV RBC AUTO: 86.8 FL — SIGNIFICANT CHANGE UP (ref 80–100)
MCV RBC AUTO: 86.8 FL — SIGNIFICANT CHANGE UP (ref 80–100)
MONOCYTES # BLD AUTO: 0.6 K/UL — SIGNIFICANT CHANGE UP (ref 0–0.9)
MONOCYTES # BLD AUTO: 0.6 K/UL — SIGNIFICANT CHANGE UP (ref 0–0.9)
MONOCYTES NFR BLD AUTO: 9 % — SIGNIFICANT CHANGE UP (ref 2–14)
MONOCYTES NFR BLD AUTO: 9 % — SIGNIFICANT CHANGE UP (ref 2–14)
NEUTROPHILS # BLD AUTO: 4.03 K/UL — SIGNIFICANT CHANGE UP (ref 1.8–7.4)
NEUTROPHILS # BLD AUTO: 4.03 K/UL — SIGNIFICANT CHANGE UP (ref 1.8–7.4)
NEUTROPHILS NFR BLD AUTO: 60.6 % — SIGNIFICANT CHANGE UP (ref 43–77)
NEUTROPHILS NFR BLD AUTO: 60.6 % — SIGNIFICANT CHANGE UP (ref 43–77)
NRBC # BLD: 0 /100 WBCS — SIGNIFICANT CHANGE UP (ref 0–0)
NRBC # BLD: 0 /100 WBCS — SIGNIFICANT CHANGE UP (ref 0–0)
PLATELET # BLD AUTO: 233 K/UL — SIGNIFICANT CHANGE UP (ref 150–400)
PLATELET # BLD AUTO: 233 K/UL — SIGNIFICANT CHANGE UP (ref 150–400)
POTASSIUM SERPL-MCNC: 4.2 MMOL/L — SIGNIFICANT CHANGE UP (ref 3.5–5.3)
POTASSIUM SERPL-MCNC: 4.2 MMOL/L — SIGNIFICANT CHANGE UP (ref 3.5–5.3)
POTASSIUM SERPL-SCNC: 4.2 MMOL/L — SIGNIFICANT CHANGE UP (ref 3.5–5.3)
POTASSIUM SERPL-SCNC: 4.2 MMOL/L — SIGNIFICANT CHANGE UP (ref 3.5–5.3)
PROT SERPL-MCNC: 5.6 G/DL — LOW (ref 6–8.3)
PROT SERPL-MCNC: 5.6 G/DL — LOW (ref 6–8.3)
RBC # BLD: 2.27 M/UL — LOW (ref 3.8–5.2)
RBC # BLD: 2.27 M/UL — LOW (ref 3.8–5.2)
RBC # BLD: 2.6 M/UL — LOW (ref 3.8–5.2)
RBC # BLD: 2.6 M/UL — LOW (ref 3.8–5.2)
RBC # FLD: 12.7 % — SIGNIFICANT CHANGE UP (ref 10.3–14.5)
RBC # FLD: 12.7 % — SIGNIFICANT CHANGE UP (ref 10.3–14.5)
RETICS #: 99.3 K/UL — SIGNIFICANT CHANGE UP (ref 25–125)
RETICS #: 99.3 K/UL — SIGNIFICANT CHANGE UP (ref 25–125)
RETICS/RBC NFR: 3.8 % — HIGH (ref 0.5–2.5)
RETICS/RBC NFR: 3.8 % — HIGH (ref 0.5–2.5)
SODIUM SERPL-SCNC: 144 MMOL/L — SIGNIFICANT CHANGE UP (ref 135–145)
SODIUM SERPL-SCNC: 144 MMOL/L — SIGNIFICANT CHANGE UP (ref 135–145)
TIBC SERPL-MCNC: 378 UG/DL — SIGNIFICANT CHANGE UP (ref 220–430)
TIBC SERPL-MCNC: 378 UG/DL — SIGNIFICANT CHANGE UP (ref 220–430)
UIBC SERPL-MCNC: 22 UG/DL — LOW (ref 110–370)
UIBC SERPL-MCNC: 22 UG/DL — LOW (ref 110–370)
VIT B12 SERPL-MCNC: 575 PG/ML — SIGNIFICANT CHANGE UP (ref 232–1245)
VIT B12 SERPL-MCNC: 575 PG/ML — SIGNIFICANT CHANGE UP (ref 232–1245)
WBC # BLD: 6.65 K/UL — SIGNIFICANT CHANGE UP (ref 3.8–10.5)
WBC # BLD: 6.65 K/UL — SIGNIFICANT CHANGE UP (ref 3.8–10.5)
WBC # FLD AUTO: 6.65 K/UL — SIGNIFICANT CHANGE UP (ref 3.8–10.5)
WBC # FLD AUTO: 6.65 K/UL — SIGNIFICANT CHANGE UP (ref 3.8–10.5)

## 2023-10-28 PROCEDURE — 99233 SBSQ HOSP IP/OBS HIGH 50: CPT

## 2023-10-28 RX ORDER — NORETHINDRONE AND ETHINYL ESTRADIOL 0.4-0.035
1 KIT ORAL DAILY
Refills: 0 | Status: DISCONTINUED | OUTPATIENT
Start: 2023-10-28 | End: 2023-10-28

## 2023-10-28 RX ORDER — SODIUM CHLORIDE 9 MG/ML
1000 INJECTION INTRAMUSCULAR; INTRAVENOUS; SUBCUTANEOUS
Refills: 0 | Status: DISCONTINUED | OUTPATIENT
Start: 2023-10-28 | End: 2023-10-28

## 2023-10-28 RX ORDER — IRON SUCROSE 20 MG/ML
100 INJECTION, SOLUTION INTRAVENOUS EVERY 24 HOURS
Refills: 0 | Status: COMPLETED | OUTPATIENT
Start: 2023-10-28 | End: 2023-10-29

## 2023-10-28 RX ADMIN — Medication 650 MILLIGRAM(S): at 19:55

## 2023-10-28 RX ADMIN — Medication 1 MILLIGRAM(S): at 11:09

## 2023-10-28 RX ADMIN — Medication 650 MILLIGRAM(S): at 00:24

## 2023-10-28 RX ADMIN — Medication 650 MILLIGRAM(S): at 22:31

## 2023-10-28 RX ADMIN — SODIUM CHLORIDE 100 MILLILITER(S): 9 INJECTION INTRAMUSCULAR; INTRAVENOUS; SUBCUTANEOUS at 01:34

## 2023-10-28 RX ADMIN — NORETHINDRONE 5 MILLIGRAM(S): 0.35 TABLET ORAL at 06:00

## 2023-10-28 RX ADMIN — Medication 3 MILLIGRAM(S): at 00:23

## 2023-10-28 RX ADMIN — IRON SUCROSE 100 MILLIGRAM(S): 20 INJECTION, SOLUTION INTRAVENOUS at 16:26

## 2023-10-28 RX ADMIN — Medication 650 MILLIGRAM(S): at 01:24

## 2023-10-28 RX ADMIN — NORETHINDRONE 5 MILLIGRAM(S): 0.35 TABLET ORAL at 17:06

## 2023-10-28 RX ADMIN — PREGABALIN 1000 MICROGRAM(S): 225 CAPSULE ORAL at 11:09

## 2023-10-28 RX ADMIN — Medication 1 TABLET(S): at 11:09

## 2023-10-28 NOTE — PATIENT PROFILE ADULT - FALL HARM RISK - HARM RISK INTERVENTIONS
Communicate Risk of Fall with Harm to all staff/Monitor gait and stability/Provide patient with walking aids - walker, cane, crutches/Reinforce activity limits and safety measures with patient and family/Review medications for side effects contributing to fall risk/Sit up slowly, dangle for a short time, stand at bedside before walking/Tailored Fall Risk Interventions/Use of alarms - bed, chair and/or voice tab/Visual Cue: Yellow wristband and red socks/Bed in lowest position, wheels locked, appropriate side rails in place/Call bell, personal items and telephone in reach/Instruct patient to call for assistance before getting out of bed or chair/Non-slip footwear when patient is out of bed/[place] to call system/Physically safe environment - no spills, clutter or unnecessary equipment/Purposeful Proactive Rounding/Room/bathroom lighting operational, light cord in reach

## 2023-10-28 NOTE — PROVIDER CONTACT NOTE (CRITICAL VALUE NOTIFICATION) - ASSESSMENT
PT AXOX4, denies chest pain/ SOB, dizziness, headache. VS WDL for pt & pt resting comfortably in bed, breathing regular

## 2023-10-29 ENCOUNTER — TRANSCRIPTION ENCOUNTER (OUTPATIENT)
Age: 23
End: 2023-10-29

## 2023-10-29 VITALS
SYSTOLIC BLOOD PRESSURE: 96 MMHG | HEART RATE: 96 BPM | TEMPERATURE: 98 F | DIASTOLIC BLOOD PRESSURE: 59 MMHG | OXYGEN SATURATION: 98 % | RESPIRATION RATE: 18 BRPM

## 2023-10-29 LAB
HCT VFR BLD CALC: 22.8 % — LOW (ref 34.5–45)
HCT VFR BLD CALC: 22.8 % — LOW (ref 34.5–45)
HGB BLD-MCNC: 7.2 G/DL — LOW (ref 11.5–15.5)
HGB BLD-MCNC: 7.2 G/DL — LOW (ref 11.5–15.5)
MCHC RBC-ENTMCNC: 27.4 PG — SIGNIFICANT CHANGE UP (ref 27–34)
MCHC RBC-ENTMCNC: 27.4 PG — SIGNIFICANT CHANGE UP (ref 27–34)
MCHC RBC-ENTMCNC: 31.6 GM/DL — LOW (ref 32–36)
MCHC RBC-ENTMCNC: 31.6 GM/DL — LOW (ref 32–36)
MCV RBC AUTO: 86.7 FL — SIGNIFICANT CHANGE UP (ref 80–100)
MCV RBC AUTO: 86.7 FL — SIGNIFICANT CHANGE UP (ref 80–100)
NRBC # BLD: 1 /100 WBCS — HIGH (ref 0–0)
NRBC # BLD: 1 /100 WBCS — HIGH (ref 0–0)
PLATELET # BLD AUTO: 320 K/UL — SIGNIFICANT CHANGE UP (ref 150–400)
PLATELET # BLD AUTO: 320 K/UL — SIGNIFICANT CHANGE UP (ref 150–400)
RBC # BLD: 2.63 M/UL — LOW (ref 3.8–5.2)
RBC # BLD: 2.63 M/UL — LOW (ref 3.8–5.2)
RBC # FLD: 13.1 % — SIGNIFICANT CHANGE UP (ref 10.3–14.5)
RBC # FLD: 13.1 % — SIGNIFICANT CHANGE UP (ref 10.3–14.5)
WBC # BLD: 7.62 K/UL — SIGNIFICANT CHANGE UP (ref 3.8–10.5)
WBC # BLD: 7.62 K/UL — SIGNIFICANT CHANGE UP (ref 3.8–10.5)
WBC # FLD AUTO: 7.62 K/UL — SIGNIFICANT CHANGE UP (ref 3.8–10.5)
WBC # FLD AUTO: 7.62 K/UL — SIGNIFICANT CHANGE UP (ref 3.8–10.5)

## 2023-10-29 PROCEDURE — 85045 AUTOMATED RETICULOCYTE COUNT: CPT

## 2023-10-29 PROCEDURE — G0378: CPT

## 2023-10-29 PROCEDURE — 85730 THROMBOPLASTIN TIME PARTIAL: CPT

## 2023-10-29 PROCEDURE — 96365 THER/PROPH/DIAG IV INF INIT: CPT

## 2023-10-29 PROCEDURE — 96375 TX/PRO/DX INJ NEW DRUG ADDON: CPT

## 2023-10-29 PROCEDURE — 83550 IRON BINDING TEST: CPT

## 2023-10-29 PROCEDURE — 82728 ASSAY OF FERRITIN: CPT

## 2023-10-29 PROCEDURE — 82746 ASSAY OF FOLIC ACID SERUM: CPT

## 2023-10-29 PROCEDURE — 82607 VITAMIN B-12: CPT

## 2023-10-29 PROCEDURE — 85245 CLOT FACTOR VIII VW RISTOCTN: CPT

## 2023-10-29 PROCEDURE — 86140 C-REACTIVE PROTEIN: CPT

## 2023-10-29 PROCEDURE — 86923 COMPATIBILITY TEST ELECTRIC: CPT

## 2023-10-29 PROCEDURE — 85246 CLOT FACTOR VIII VW ANTIGEN: CPT

## 2023-10-29 PROCEDURE — 83540 ASSAY OF IRON: CPT

## 2023-10-29 PROCEDURE — 96361 HYDRATE IV INFUSION ADD-ON: CPT

## 2023-10-29 PROCEDURE — 96376 TX/PRO/DX INJ SAME DRUG ADON: CPT

## 2023-10-29 PROCEDURE — 76830 TRANSVAGINAL US NON-OB: CPT

## 2023-10-29 PROCEDURE — 86850 RBC ANTIBODY SCREEN: CPT

## 2023-10-29 PROCEDURE — 99285 EMERGENCY DEPT VISIT HI MDM: CPT

## 2023-10-29 PROCEDURE — 99239 HOSP IP/OBS DSCHRG MGMT >30: CPT

## 2023-10-29 PROCEDURE — 85610 PROTHROMBIN TIME: CPT

## 2023-10-29 PROCEDURE — 85025 COMPLETE CBC W/AUTO DIFF WBC: CPT

## 2023-10-29 PROCEDURE — 80053 COMPREHEN METABOLIC PANEL: CPT

## 2023-10-29 PROCEDURE — 84702 CHORIONIC GONADOTROPIN TEST: CPT

## 2023-10-29 PROCEDURE — 86900 BLOOD TYPING SEROLOGIC ABO: CPT

## 2023-10-29 PROCEDURE — 86901 BLOOD TYPING SEROLOGIC RH(D): CPT

## 2023-10-29 PROCEDURE — 85652 RBC SED RATE AUTOMATED: CPT

## 2023-10-29 PROCEDURE — 85247 CLOT FACTOR VIII MULTIMETRIC: CPT

## 2023-10-29 PROCEDURE — 85027 COMPLETE CBC AUTOMATED: CPT

## 2023-10-29 PROCEDURE — 36415 COLL VENOUS BLD VENIPUNCTURE: CPT

## 2023-10-29 RX ORDER — PREGABALIN 225 MG/1
1 CAPSULE ORAL
Qty: 0 | Refills: 0 | DISCHARGE
Start: 2023-10-29

## 2023-10-29 RX ORDER — NORETHINDRONE AND ETHINYL ESTRADIOL 0.4-0.035
1 KIT ORAL
Qty: 28 | Refills: 2
Start: 2023-10-29 | End: 2024-01-20

## 2023-10-29 RX ORDER — NORETHINDRONE 0.35 MG/1
1 TABLET ORAL
Qty: 16 | Refills: 0
Start: 2023-10-29 | End: 2023-11-05

## 2023-10-29 RX ORDER — FOLIC ACID 0.8 MG
1 TABLET ORAL
Qty: 0 | Refills: 0 | DISCHARGE
Start: 2023-10-29

## 2023-10-29 RX ORDER — NORETHINDRONE AND ETHINYL ESTRADIOL 0.4-0.035
1 KIT ORAL
Qty: 30 | Refills: 2
Start: 2023-10-29 | End: 2024-01-26

## 2023-10-29 RX ADMIN — NORETHINDRONE 5 MILLIGRAM(S): 0.35 TABLET ORAL at 05:16

## 2023-10-29 RX ADMIN — PREGABALIN 1000 MICROGRAM(S): 225 CAPSULE ORAL at 11:09

## 2023-10-29 RX ADMIN — IRON SUCROSE 100 MILLIGRAM(S): 20 INJECTION, SOLUTION INTRAVENOUS at 13:41

## 2023-10-29 RX ADMIN — NORETHINDRONE 5 MILLIGRAM(S): 0.35 TABLET ORAL at 17:28

## 2023-10-29 RX ADMIN — Medication 1 MILLIGRAM(S): at 11:09

## 2023-10-29 RX ADMIN — Medication 1 TABLET(S): at 11:09

## 2023-10-29 NOTE — DISCHARGE NOTE PROVIDER - NSDCCPCAREPLAN_GEN_ALL_CORE_FT
PRINCIPAL DISCHARGE DIAGNOSIS  Diagnosis: Vaginal bleeding  Assessment and Plan of Treatment: Please take medications as prescribed and follow up with gynecology and hematology      SECONDARY DISCHARGE DIAGNOSES  Diagnosis: Acute anemia  Assessment and Plan of Treatment: Please take medications which will help increase your body's own blood cell production, though it may take some time for your blood levels to recover

## 2023-10-29 NOTE — DISCHARGE NOTE PROVIDER - YES NO FOR MLM POSITIVE OR NEGATIVE COVID RESULT
Problem: Falls - Risk of:  Goal: Will remain free from falls  Description  Will remain free from falls  Outcome: Ongoing     Problem: Pain:  Goal: Control of acute pain  Description  Control of acute pain  Outcome: Ongoing     Problem: Risk for Impaired Skin Integrity  Goal: Tissue integrity - skin and mucous membranes  Description  Structural intactness and normal physiological function of skin and  mucous membranes.   Outcome: Ongoing     Problem: Nutrition  Goal: Optimal nutrition therapy  Outcome: Ongoing ,

## 2023-10-29 NOTE — PROGRESS NOTE ADULT - NSPROGADDITIONALINFOA_GEN_ALL_CORE
Possible DC today as vaginal bleeding has resolved    Anemia is not actionable (refuses transfusions) and patient understands that it may take weeks for blood levels to approach her baseline    Discharge Time: 45 minutes

## 2023-10-29 NOTE — PROGRESS NOTE ADULT - ASSESSMENT
[ASSESSMENT and  PLAN]  D62      Anemia Hemorrhage  D50.0   Fe def anemia  N92.1    Excessive and frequent menstruation with irregular cycle  Z53.1    Procedure and treatment not carried out because of patient's decision for reasons of belief and Jehovah Witness    24yo with Jehovah Witness with anemia due to vaginal bleeding.   Pt with symptomatic anemia  Declined/ refused transfusions in ER and on my discussion with pt while in ER later in day 10/27/23  States aware may die from no transfusion, for which pt is OK with. Hx of transfusion prior at 11yo.   Pt awake, alert, AOx4, has insight into health issues, and risk.     Offered YVONNE, IV iron, IM B12 and PO Folic acid to possible help with blood production.   Pt was made aware that such measures may take days or more likely weeks to increase blood production.   During this time, pt may succumb to cardiovascular complications from anemia, including but limited to, CVA, MI, death, CHF, or permanent disability.  Pt asked appropriate questions, Conferred with [employer] member via telephone while in ER, and agreeable to measures above.     Recommended to pt to be admitted to hospital and to monitor Hgb  10/28/23 Hgb stable. Reviewed treatment plan. Agreeable to IV iron, B12, and Folic acid.       RECOMMENDATIONS    Admit for monitoring  Recommend telemetry    No transfusion, per pt.   Follow CBC  Limit blood draws to necessary blood once per day, eg CBC.     s/p Procrit 10K U yesterday.   s/p IV iron 200mg   s/p B12 1000mcg IM, cotninue  cont Folic 1mg daily.     Eval for other treatable condition  check VWF Ag, VWF activity, VWF multimers.    Gyn eval appreciated.     DVT Prophylaxis  SQ Lovenox or SQ heparin contraindicated  Place on SCD    No strenuous activity.   OOB only with assistance.     Prognosis guarded.     Discussed plan of care with patient in detail.   Pt younger sister and father at bedside.   Pt  expressed understanding of the treatment plan.   Risks, benefits and alternatives discussed in detail.   Opportunity given for questions and discussion.   Questions or concerns all addressed and answered to their satisfaction, and in lay terms.     Thank you for consulting us.     
[ASSESSMENT and  PLAN]  D62      Anemia Hemorrhage  D50.0   Fe def anemia  N92.1    Excessive and frequent menstruation with irregular cycle  Z53.1    Procedure and treatment not carried out because of patient's decision for reasons of belief and Jehovah Witness    22yo with Jehovah Witness with anemia due to vaginal bleeding.   Pt with symptomatic anemia  Declined/ refused transfusions in ER and on my discussion with pt while in ER later in day 10/27/23  States aware may die from no transfusion, for which pt is OK with. Hx of transfusion prior at 11yo.   Pt awake, alert, AOx4, has insight into health issues, and risk.     Offered YVONNE, IV iron, IM B12 and PO Folic acid to possible help with blood production.   Pt was made aware that such measures may take days or more likely weeks to increase blood production.   During this time, pt may succumb to cardiovascular complications from anemia, including but limited to, CVA, MI, death, CHF, or permanent disability.  Pt asked appropriate questions, Conferred with [employer] member via telephone while in ER, and agreeable to measures above.     Recommended to pt to be admitted to hospital and to monitor Hgb  10/28/23 Hgb stable. Reviewed treatment plan. Agreeable to IV iron, B12, and Folic acid.       RECOMMENDATIONS    Admit for monitoring  Recommend telemetry    No transfusion, per pt.   Follow CBC  Limit blood draws to necessary blood once per day, eg CBC.     s/p Procrit 10K U Fri.   s/p IV iron 200mg   s/p B12 1000mcg IM, continue  cont Folic 1mg daily.     Eval for other treatable condition  Followup VWF Ag, VWF activity, VWF multimers.    Gyn eval appreciated.     DVT Prophylaxis  SQ Lovenox or SQ heparin contraindicated  Place on SCD    No strenuous activity.   OOB only with assistance.     Prognosis guarded.   if significant rebleeding return to ER    Discussed plan of care with patient in detail.   Pt younger sister and father at bedside.   Pt  expressed understanding of the treatment plan.   Risks, benefits and alternatives discussed in detail.   Opportunity given for questions and discussion.   Questions or concerns all addressed and answered to their satisfaction, and in lay terms.       DC planning.     Thank you for consulting us.   No additional recommendations at current time.   Will sign off on case for now.   Please call, or re-consult if needed.     
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 Admitted for abnormal/prolonged vaginal bleeding.
stable but guarded
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 Admitted for abnormal/prolonged vaginal bleeding.

## 2023-10-29 NOTE — PROGRESS NOTE ADULT - SUBJECTIVE AND OBJECTIVE BOX
[INTERVAL HX: ]  Patient seen and examined;  Chart reviewed and events noted;     c/o HA    LH better.   no vaginal bleeding today.         [MEDICATIONS]  MEDICATIONS  (STANDING):  cyanocobalamin 1000 MICROGram(s) Oral daily  folic acid 1 milliGRAM(s) Oral daily  multivitamin 1 Tablet(s) Oral daily  norethindrone acetate 5 milliGRAM(s) Oral <User Schedule>    MEDICATIONS  (PRN):  acetaminophen     Tablet .. 650 milliGRAM(s) Oral every 6 hours PRN Temp greater or equal to 38C (100.4F), Mild Pain (1 - 3)  melatonin 3 milliGRAM(s) Oral at bedtime PRN Insomnia  ondansetron Injectable 4 milliGRAM(s) IV Push every 8 hours PRN Nausea and/or Vomiting      [VITALS]  Vital Signs Last 24 Hrs  T(C): 36.8 (29 Oct 2023 12:00), Max: 37.3 (28 Oct 2023 20:17)  T(F): 98.3 (29 Oct 2023 12:00), Max: 99.1 (28 Oct 2023 20:17)  HR: 96 (29 Oct 2023 12:00) (77 - 96)  BP: 96/59 (29 Oct 2023 12:00) (96/59 - 100/60)  BP(mean): --  RR: 18 (29 Oct 2023 12:00) (18 - 19)  SpO2: 98% (29 Oct 2023 12:00) (98% - 98%)    Parameters below as of 29 Oct 2023 12:00  Patient On (Oxygen Delivery Method): room air      [WT/HT]  Daily     Daily   [VENT]      [PHYSICAL EXAM]  GEN: NAD  HEENT: normocephalic and atraumatic. EOMI. .    NECK: Supple.  No lymphadenopathy   LUNGS: Clear to auscultation.  HEART: Regular rate and rhythm,  no MRG  ABDOMEN: Soft, nontender, and nondistended.  Positive bowel sounds.    : No CVA tenderness  EXTREMITIES: Without edema.  NEUROLOGIC: grossly intact.  PSYCHIATRIC: Appropriate affect .  SKIN: No rash     [LABS:]                        7.2    7.62  )-----------( 320      ( 29 Oct 2023 07:48 )             22.8     10-28    144  |  114<H>  |  10  ----------------------------<  124<H>  4.2   |  27  |  0.61    Ca    7.8<L>      28 Oct 2023 04:30    TPro  5.6<L>  /  Alb  2.8<L>  /  TBili  0.1<L>  /  DBili  x   /  AST  26  /  ALT  27  /  AlkPhos  72  10-28          Ferritin: 91 ng/mL [15 - 150] (10-28-23 @ 14:35)    Iron - Total Binding Capacity.: 378 ug/dL [220 - 430] (10-28-23 @ 14:35)    Vitamin B12, Serum: 575 pg/mL [232 - 1245] (10-28-23 @ 14:35)    Folate, Serum: >20.0 ng/mL (10-28-23 @ 14:35)    Sedimentation Rate, Erythrocyte: 28 mm/hr *H* [0 - 15] (10-28-23 @ 14:35)    Reticulocyte Count (10-28-23 @ 14:35)  Reticulocyte Percent: 3.8 % *H* [0.5 - 2.5]  Absolute Reticulocytes: 99.3 K/uL [25.0 - 125.0]      Urinalysis Basic - ( 28 Oct 2023 04:30 )    Color: x / Appearance: x / SG: x / pH: x  Gluc: 124 mg/dL / Ketone: x  / Bili: x / Urobili: x   Blood: x / Protein: x / Nitrite: x   Leuk Esterase: x / RBC: x / WBC x   Sq Epi: x / Non Sq Epi: x / Bacteria: x                [RADIOLOGY STUDIES:]    
Patient is a 23y old  Female who presents with a chief complaint of Vaginal Bleeding (28 Oct 2023 07:48)      SUBJECTIVE / OVERNIGHT EVENTS: Patient seen and examined at bedside. No acute events overnight. Vaginal bleeding may have stopped. Mild lightheadedness when ambulating    MEDICATIONS  (STANDING):  cyanocobalamin 1000 MICROGram(s) Oral daily  folic acid 1 milliGRAM(s) Oral daily  multivitamin 1 Tablet(s) Oral daily  norethindrone acetate 5 milliGRAM(s) Oral <User Schedule>    MEDICATIONS  (PRN):  acetaminophen     Tablet .. 650 milliGRAM(s) Oral every 6 hours PRN Temp greater or equal to 38C (100.4F), Mild Pain (1 - 3)  melatonin 3 milliGRAM(s) Oral at bedtime PRN Insomnia  ondansetron Injectable 4 milliGRAM(s) IV Push every 8 hours PRN Nausea and/or Vomiting      CAPILLARY BLOOD GLUCOSE        I&O's Summary      PHYSICAL EXAM:  Vital Signs Last 24 Hrs  T(C): 36.8 (28 Oct 2023 04:34), Max: 37.2 (27 Oct 2023 23:08)  T(F): 98.2 (28 Oct 2023 04:34), Max: 99 (27 Oct 2023 23:08)  HR: 78 (28 Oct 2023 04:34) (67 - 100)  BP: 107/61 (28 Oct 2023 04:34) (92/67 - 109/70)  BP(mean): --  RR: 18 (28 Oct 2023 04:34) (18 - 18)  SpO2: 99% (28 Oct 2023 04:34) (97% - 100%)    Parameters below as of 28 Oct 2023 04:34  Patient On (Oxygen Delivery Method): room air        GEN: female in NAD, appears comfortable, no diaphoresis  EYES: No scleral injection, EOMI  ENTM: neck supple & symmetric without tracheal deviation, moist membranes, no gross hearing impairment, thyroid gland not enlarged  CV: +S1/S2, no m/r/g, no abdominal bruit, no LE edema  RESP: breathing comfortably, no respiratory accessory muscle use, CTAB, no w/r/r  GI: normoactive BS, soft, NTND, no rebounding/guarding, no palpable masses    LABS:                        6.3    6.65  )-----------( 233      ( 28 Oct 2023 04:30 )             19.7     10-28    144  |  114<H>  |  10  ----------------------------<  124<H>  4.2   |  27  |  0.61    Ca    7.8<L>      28 Oct 2023 04:30    TPro  5.6<L>  /  Alb  2.8<L>  /  TBili  0.1<L>  /  DBili  x   /  AST  26  /  ALT  27  /  AlkPhos  72  10-28    PT/INR - ( 27 Oct 2023 00:21 )   PT: 12.8 sec;   INR: 1.10 ratio         PTT - ( 27 Oct 2023 00:21 )  PTT:31.1 sec      Urinalysis Basic - ( 28 Oct 2023 04:30 )    Color: x / Appearance: x / SG: x / pH: x  Gluc: 124 mg/dL / Ketone: x  / Bili: x / Urobili: x   Blood: x / Protein: x / Nitrite: x   Leuk Esterase: x / RBC: x / WBC x   Sq Epi: x / Non Sq Epi: x / Bacteria: x          RADIOLOGY & ADDITIONAL TESTS:  Results Reviewed:   Imaging Personally Reviewed:  Electrocardiogram Personally Reviewed:    COORDINATION OF CARE:  Care Discussed with Consultants/Other Providers [Y/N]:  Prior or Outpatient Records Reviewed [Y/N]:  
[INTERVAL HX: ]  Patient seen and examined;  Chart reviewed and events noted;     [MEDICATIONS]  MEDICATIONS  (STANDING):  cyanocobalamin 1000 MICROGram(s) Oral daily  folic acid 1 milliGRAM(s) Oral daily  iron sucrose Injectable 100 milliGRAM(s) IV Push every 24 hours  multivitamin 1 Tablet(s) Oral daily  norethindrone acetate 5 milliGRAM(s) Oral <User Schedule>    MEDICATIONS  (PRN):  acetaminophen     Tablet .. 650 milliGRAM(s) Oral every 6 hours PRN Temp greater or equal to 38C (100.4F), Mild Pain (1 - 3)  melatonin 3 milliGRAM(s) Oral at bedtime PRN Insomnia  ondansetron Injectable 4 milliGRAM(s) IV Push every 8 hours PRN Nausea and/or Vomiting      [VITALS]  Vital Signs Last 24 Hrs  T(C): 36.8 (28 Oct 2023 11:27), Max: 37.2 (27 Oct 2023 23:08)  T(F): 98.2 (28 Oct 2023 11:27), Max: 99 (27 Oct 2023 23:08)  HR: 81 (28 Oct 2023 11:27) (78 - 100)  BP: 102/69 (28 Oct 2023 11:27) (102/65 - 109/70)  BP(mean): --  RR: 18 (28 Oct 2023 11:27) (18 - 18)  SpO2: 100% (28 Oct 2023 11:27) (97% - 100%)    Parameters below as of 28 Oct 2023 11:27  Patient On (Oxygen Delivery Method): room air      [WT/HT]  Daily     Daily Weight in k.3 (28 Oct 2023 04:34)  [VENT]      [PHYSICAL EXAM]  GEN: NAD  HEENT: normocephalic and atraumatic. EOMI. PERRL.    NECK: Supple.  No lymphadenopathy   LUNGS: Clear to auscultation.  HEART: Regular rate and rhythm,  no MRG  ABDOMEN: Soft, nontender, and nondistended.  Positive bowel sounds.    : No CVA tenderness  EXTREMITIES: Without edema.  NEUROLOGIC: grossly intact.  PSYCHIATRIC: Appropriate affect .  SKIN: No rash     [LABS:]                        6.3    6.65  )-----------( 233      ( 28 Oct 2023 04:30 )             19.7     10-28    144  |  114<H>  |  10  ----------------------------<  124<H>  4.2   |  27  |  0.61    Ca    7.8<L>      28 Oct 2023 04:30    TPro  5.6<L>  /  Alb  2.8<L>  /  TBili  0.1<L>  /  DBili  x   /  AST  26  /  ALT  27  /  AlkPhos  72  10-28    PT/INR - ( 27 Oct 2023 00:21 )   PT: 12.8 sec;   INR: 1.10 ratio         PTT - ( 27 Oct 2023 00:21 )  PTT:31.1 sec        Urinalysis Basic - ( 28 Oct 2023 04:30 )    Color: x / Appearance: x / SG: x / pH: x  Gluc: 124 mg/dL / Ketone: x  / Bili: x / Urobili: x   Blood: x / Protein: x / Nitrite: x   Leuk Esterase: x / RBC: x / WBC x   Sq Epi: x / Non Sq Epi: x / Bacteria: x                [RADIOLOGY STUDIES:]    
Update on Current  Events: Ms Longo is resting comfortably. She offers no compliant. Vaginal bleeding is much improved with TXA and Northindrone. S/P   IV iron and suppplementations    Abnormal uterine and vaginal bleeding    No pertinent family history in first degree relatives    MEWS Score    No pertinent past medical history    Uterus didelphys    Vaginal bleeding    DUB (dysfunctional uterine bleeding)    Anemia due to chronic blood loss    Vaginal bleeding    Need for prophylactic measure    No significant past surgical history    PERIOD 3 WEEKS WEAKNESS    90+    Acute anemia          acetaminophen     Tablet .. 650 milliGRAM(s) Oral every 6 hours PRN  cyanocobalamin 1000 MICROGram(s) Oral daily  folic acid 1 milliGRAM(s) Oral daily  melatonin 3 milliGRAM(s) Oral at bedtime PRN  multivitamin 1 Tablet(s) Oral daily  norethindrone acetate 5 milliGRAM(s) Oral <User Schedule>  ondansetron Injectable 4 milliGRAM(s) IV Push every 8 hours PRN      Vital Signs Last 24 Hrs  T(C): 36.8 (28 Oct 2023 11:27), Max: 37.2 (27 Oct 2023 23:08)  T(F): 98.2 (28 Oct 2023 11:27), Max: 99 (27 Oct 2023 23:08)  HR: 81 (28 Oct 2023 11:27) (78 - 100)  BP: 102/69 (28 Oct 2023 11:27) (102/65 - 109/70)  BP(mean): --  RR: 18 (28 Oct 2023 11:27) (18 - 18)  SpO2: 100% (28 Oct 2023 11:27) (97% - 100%)    Parameters below as of 28 Oct 2023 11:27  Patient On (Oxygen Delivery Method): room air        PHYSICAL EXAM:     young W/F W/D in NAD  vaginal bleeding is light.                                6.3    6.65  )-----------( 233      ( 28 Oct 2023 04:30 )             19.7   28 Oct 2023 04:30    144    |  114    |  10     ----------------------------<  124    4.2     |  27     |  0.61     Ca    7.8        28 Oct 2023 04:30    TPro  5.6    /  Alb  2.8    /  TBili  0.1    /  DBili  x      /  AST  26     /  ALT  27     /  AlkPhos  72     28 Oct 2023 04:30  PT/INR - ( 27 Oct 2023 00:21 )   PT: 12.8 sec;   INR: 1.10 ratio         PTT - ( 27 Oct 2023 00:21 )  PTT:31.1 sec    Patient seen and examined today Plan discussed/Questions answered  (with patient/ancillary staff/colleagues)   Chart notes reviewed.    Medications were reviewed with her. She will take northindrone 5 mg (Aygestin)  one tablet  twice a day for 10 days. Stop it, have a withdrawal bleed for two-three days then start birth control pill-Nortrel 1/35. She should continue with birth control pills to regulate her menstrual flow. This will help keep her from developing anemia.  She should f/u with her GYN on a regular basis    reconsult if her condition changes.      
Patient is a 23y old  Female who presents with a chief complaint of Vaginal Bleeding (29 Oct 2023 04:40)      SUBJECTIVE / OVERNIGHT EVENTS: Patient seen and examined at bedside. No acute events overnight. Vaginal bleeding has stopped. Ambulating around room okay with only mild lightheadedness/headache. Feels overall well.    MEDICATIONS  (STANDING):  cyanocobalamin 1000 MICROGram(s) Oral daily  folic acid 1 milliGRAM(s) Oral daily  iron sucrose Injectable 100 milliGRAM(s) IV Push every 24 hours  multivitamin 1 Tablet(s) Oral daily  norethindrone acetate 5 milliGRAM(s) Oral <User Schedule>    MEDICATIONS  (PRN):  acetaminophen     Tablet .. 650 milliGRAM(s) Oral every 6 hours PRN Temp greater or equal to 38C (100.4F), Mild Pain (1 - 3)  melatonin 3 milliGRAM(s) Oral at bedtime PRN Insomnia  ondansetron Injectable 4 milliGRAM(s) IV Push every 8 hours PRN Nausea and/or Vomiting      CAPILLARY BLOOD GLUCOSE        I&O's Summary      PHYSICAL EXAM:  Vital Signs Last 24 Hrs  T(C): 36.4 (29 Oct 2023 04:27), Max: 37.3 (28 Oct 2023 20:17)  T(F): 97.6 (29 Oct 2023 04:27), Max: 99.1 (28 Oct 2023 20:17)  HR: 77 (29 Oct 2023 04:27) (77 - 90)  BP: 99/66 (29 Oct 2023 04:27) (99/66 - 102/69)  BP(mean): --  RR: 19 (29 Oct 2023 04:27) (18 - 19)  SpO2: 98% (29 Oct 2023 04:27) (98% - 100%)    Parameters below as of 29 Oct 2023 04:27  Patient On (Oxygen Delivery Method): room air        GEN: female in NAD, appears comfortable, no diaphoresis  EYES: No scleral injection, EOMI  ENTM: neck supple & symmetric without tracheal deviation, moist membranes, no gross hearing impairment, thyroid gland not enlarged  CV: +S1/S2, no m/r/g, no abdominal bruit, no LE edema  RESP: breathing comfortably, no respiratory accessory muscle use, CTAB, no w/r/r  GI: normoactive BS, soft, NTND, no rebounding/guarding, no palpable masses    LABS:                        6.3    6.65  )-----------( 233      ( 28 Oct 2023 04:30 )             19.7     10-28    144  |  114<H>  |  10  ----------------------------<  124<H>  4.2   |  27  |  0.61    Ca    7.8<L>      28 Oct 2023 04:30    TPro  5.6<L>  /  Alb  2.8<L>  /  TBili  0.1<L>  /  DBili  x   /  AST  26  /  ALT  27  /  AlkPhos  72  10-28          Urinalysis Basic - ( 28 Oct 2023 04:30 )    Color: x / Appearance: x / SG: x / pH: x  Gluc: 124 mg/dL / Ketone: x  / Bili: x / Urobili: x   Blood: x / Protein: x / Nitrite: x   Leuk Esterase: x / RBC: x / WBC x   Sq Epi: x / Non Sq Epi: x / Bacteria: x          RADIOLOGY & ADDITIONAL TESTS:  Results Reviewed:   Imaging Personally Reviewed:  Electrocardiogram Personally Reviewed:    COORDINATION OF CARE:  Care Discussed with Consultants/Other Providers [Y/N]:  Prior or Outpatient Records Reviewed [Y/N]:

## 2023-10-29 NOTE — DISCHARGE NOTE PROVIDER - HOSPITAL COURSE
HPI:  23-year-old female with PMHx of uterine didelyphys presents to the ED for vaginal bleeding with associated lethargy and dizziness x1 month. Pt states that she started her period on October 2nd; however, bleeding never stopped. Initially pt was using 2-3 pads a day; however, 2 weeks ago bleeding escalated. Pt states that she was wearing both a tampon and pad and she was soaking through both. Pt states that she has had 1 episode of excessive vaginal bleeding in 2012. Pt was admitted to [facility] tx with blood transfusion and iron. Pt was then started on birth control. Birth control stopped 2 yrs ago. States initially periods were regular; however, became irregular soon after; however, prior to this episode, periods have been generally mild. States that she was able to wear a tampon with a liner. Pt elicits no family hx of bleeding disorders. No hx of STDs, ovarian cysts, or fibroids. No abnormal pap smears in the past. Pt states that she saw PA at her [facility] office couple days ago. Pelvic ultrasound ordered and performed; however, she has not yet received the results. Pt was prescribed PO iron and Alyacen 1-35 28 PO. Has not taken any of the medication.     ED course  Vitals: T 98.5, HR 86, /77 -> 72/43 -> 109/70, RR 18, SpO2 100% on RA  Significant labs: Hb 7.4 -> 6.4,   Imaging:   Vaginal US shows Heterogeneous echotexture of the endometrium, which may be due to blood products/debris. Underlying hyperplasia or lesion is not excluded. Recommend clinical correlation and follow-up.  In ED patient given: NS bolus x2, ofirmev x1, EPO x1, Venofer 200mg x1, toradol 15mg x1, zofran 4mg x1, TXA 1g x2   (27 Oct 2023 21:57)    Hospital Course:  Patient admitted to medicine. She refused pRBC given Oriental orthodox. Seen by heme who gave EPOGEN, IV Iron and started B12/Folica Acid supplements. Patient seen by gynecology. She likely has acute blood loss anemia 2/2 to abnormal vaginal bleeding. US as above. Patient started on Aygestin which ceased the menorrhagia. She will be discharged on OCP as recommended by Gynecology and will follow up closely outpatient. Also recommended hematology for further optimization and to follow up on VWF labs sent.

## 2023-10-29 NOTE — CHART NOTE - NSCHARTNOTEFT_GEN_A_CORE
To Whom It May Concern:    Marta Longo may return to work without restrictions starting October 30, 2023. 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    Sincerely,   Rishi Khalil M.D.

## 2023-10-29 NOTE — DISCHARGE NOTE PROVIDER - NSDCMRMEDTOKEN_GEN_ALL_CORE_FT
cyanocobalamin 1000 mcg oral tablet: 1 tab(s) orally once a day  folic acid 1 mg oral tablet: 1 tab(s) orally once a day  ISOtretinoin (Eqv-Absorica) 40 mg oral capsule: 2 cap(s) orally once a day  Multiple Vitamins oral tablet: 1 tab(s) orally once a day  norethindrone 5 mg oral tablet: 1 tab(s) orally 2 times a day Take this medication first upon discharge  Nortrel 1/35 (28-Day Pack) oral tablet: 1 tab(s) orally once a day Take this 2-3 days after Aygestin

## 2023-10-29 NOTE — PROGRESS NOTE ADULT - PROBLEM SELECTOR PLAN 1
Pt presents to the ED for vaginal bleeding x1 month (LMP 10/2)  - Pt is a [employer] and does not accept blood products; risk of arrhythmia and death explained to patient. Pt expresses understanding. Will optimize pt medically without transfusion.  - Vaginal US shows Heterogeneous echotexture of the endometrium, which may be due to blood products/debris. Underlying hyperplasia or lesion is not excluded.  - Recommend OP MRI (unlikely results would change inpatient management)  - Seen by heme and received IV iron and EPOGEN  - Continue with FA and B12 (patient understands response may take weeks)  - Per [facility], Aygestin 5 mg one tablet BID x 10 days and then Nortrel 1/35 28 day pack to take q day.  - Limit blood draws to necessary blood  - OP heme follow up (they sent vWF serologies, in addition, may need future IV infusions)
Pt presents to the ED for vaginal bleeding x1 month (LMP 10/2)  - Pt is a jehovahs witness and does not accept blood products; risk of arrhythmia and death explained to patient. Pt expresses understanding. Will optimize pt medically without transfusion.  - Vaginal US shows Heterogeneous echotexture of the endometrium, which may be due to blood products/debris. Underlying hyperplasia or lesion is not excluded.  - Perform MRI Pelvis  - Seen by tung and received IV iron and EPOGEN  - Continue with FA and B12 (patient understands response may take weeks)  - Per CHARLEE, Aygestin 5 mg one tablet BID x 10 days and then Nortrel 1/35 28 day pack to take q day.  - Limit blood draws to necessary blood  - ambulate with assistance
Aygestine 5 mg BID x 10 days  Nortrel 1/35 28 day  Hematology work up

## 2023-10-29 NOTE — DISCHARGE NOTE NURSING/CASE MANAGEMENT/SOCIAL WORK - PATIENT PORTAL LINK FT
You can access the FollowMyHealth Patient Portal offered by [facility] by registering at the following website: [email or web address]. By joining [employer]’s FollowMyHealth portal, you will also be able to view your health information using other applications (apps) compatible with our system.

## 2023-10-29 NOTE — DISCHARGE NOTE PROVIDER - CARE PROVIDER_API CALL
Hui Bello  Obstetrics and Gynecology  [address] [place] [address]  Phone: [phone]  Fax: [phone]  Established Patient  Follow Up Time: 1 month    Hon Markos Kingston  Medical Oncology  [address], [address]  [place] [address]  Phone: [phone]  Fax: [phone]  Established Patient  Follow Up Time: 1 month

## 2023-10-29 NOTE — DISCHARGE NOTE PROVIDER - PROVIDER TOKENS
PROVIDER:[TOKEN:[[id number]],FOLLOWUP:[1 month],ESTABLISHEDPATIENT:[T]],PROVIDER:[TOKEN:[[id number]],FOLLOWUP:[1 month],ESTABLISHEDPATIENT:[T]]

## 2023-10-30 LAB
VWF AG ACT/NOR PPP IA: 180 % — HIGH (ref 63–170)
VWF AG ACT/NOR PPP IA: 180 % — HIGH (ref 63–170)
VWF:RCO ACT/NOR PPP PL AGG: 163 % — HIGH (ref 45–133)
VWF:RCO ACT/NOR PPP PL AGG: 163 % — HIGH (ref 45–133)

## 2023-11-08 LAB
VWF CBA/VWF AG PPP IA-RTO: SIGNIFICANT CHANGE UP
VWF CBA/VWF AG PPP IA-RTO: SIGNIFICANT CHANGE UP

## 2024-11-07 NOTE — PATIENT PROFILE ADULT - HISTORY OF COVID-19 VACCINATION
Pt schedule for ED f/u with provider for left flank Pain. Pt states she has tried to call pain management for medication to be sent in and she states they will not send her any refills or medication.     Please advise.     Pt req pain medication from provider.     Vaccine status unknown

## 2025-02-02 ENCOUNTER — NON-APPOINTMENT (OUTPATIENT)
Age: 25
End: 2025-02-02

## 2025-03-12 ENCOUNTER — NON-APPOINTMENT (OUTPATIENT)
Age: 25
End: 2025-03-12

## 2025-03-12 NOTE — ED ADULT NURSE NOTE - ISOLATION TYPE:
Patient calling about prednisone again. I told him we will call him when the doctor has reviewed the request.   None